# Patient Record
Sex: FEMALE | Race: WHITE | Employment: OTHER | URBAN - METROPOLITAN AREA
[De-identification: names, ages, dates, MRNs, and addresses within clinical notes are randomized per-mention and may not be internally consistent; named-entity substitution may affect disease eponyms.]

---

## 2017-02-28 RX ORDER — ATORVASTATIN CALCIUM 10 MG/1
TABLET, FILM COATED ORAL
Qty: 90 TABLET | Refills: 3 | Status: ON HOLD | OUTPATIENT
Start: 2017-02-28 | End: 2017-11-11

## 2017-02-28 RX ORDER — METOPROLOL SUCCINATE 25 MG/1
TABLET, EXTENDED RELEASE ORAL
Qty: 45 TABLET | Refills: 3 | Status: ON HOLD | OUTPATIENT
Start: 2017-02-28 | End: 2017-11-21

## 2017-08-14 RX ORDER — FERROUS SULFATE 325(65) MG
TABLET ORAL
Qty: 90 TABLET | Refills: 0 | OUTPATIENT
Start: 2017-08-14

## 2017-08-18 RX ORDER — FERROUS SULFATE 325(65) MG
TABLET ORAL
Qty: 90 TABLET | Refills: 3
Start: 2017-08-18

## 2017-08-18 NOTE — TELEPHONE ENCOUNTER
From: Angela Fisher  Sent: 8/18/2017 9:26 AM CDT  Subject: Medication Renewal Request    Angela Fisher would like a refill of the following medications:  FERROUS SULFATE 325 (65 FE) MG Oral Tab Kimberly Marcos MD]    Preferred pharmacy: [address] [place] [address] #

## 2017-09-16 ENCOUNTER — APPOINTMENT (OUTPATIENT)
Dept: CT IMAGING | Facility: HOSPITAL | Age: 82
DRG: 871 | End: 2017-09-16
Attending: EMERGENCY MEDICINE
Payer: MEDICARE

## 2017-09-16 ENCOUNTER — HOSPITAL ENCOUNTER (INPATIENT)
Facility: HOSPITAL | Age: 82
LOS: 5 days | Discharge: NURSING, REHAB OR LONG-TERM CARE | DRG: 871 | End: 2017-09-21
Attending: EMERGENCY MEDICINE | Admitting: HOSPITALIST
Payer: MEDICARE

## 2017-09-16 ENCOUNTER — APPOINTMENT (OUTPATIENT)
Dept: GENERAL RADIOLOGY | Facility: HOSPITAL | Age: 82
DRG: 871 | End: 2017-09-16
Attending: EMERGENCY MEDICINE
Payer: MEDICARE

## 2017-09-16 DIAGNOSIS — D50.9 IRON DEFICIENCY ANEMIA, UNSPECIFIED IRON DEFICIENCY ANEMIA TYPE: ICD-10-CM

## 2017-09-16 DIAGNOSIS — R19.7 DIARRHEA, UNSPECIFIED TYPE: ICD-10-CM

## 2017-09-16 DIAGNOSIS — N17.9 ACUTE RENAL FAILURE, UNSPECIFIED ACUTE RENAL FAILURE TYPE (HCC): ICD-10-CM

## 2017-09-16 DIAGNOSIS — A41.9 SEPSIS WITH HYPOTENSION (HCC): Primary | ICD-10-CM

## 2017-09-16 DIAGNOSIS — I95.9 SEPSIS WITH HYPOTENSION (HCC): Primary | ICD-10-CM

## 2017-09-16 DIAGNOSIS — N30.01 ACUTE CYSTITIS WITH HEMATURIA: ICD-10-CM

## 2017-09-16 LAB
ALBUMIN SERPL-MCNC: 2.1 G/DL (ref 3.5–4.8)
ALLENS TEST: POSITIVE
ALP LIVER SERPL-CCNC: 504 U/L (ref 55–142)
ALT SERPL-CCNC: 25 U/L (ref 14–54)
ARTERIAL BLD GAS O2 SATURATION: 91 % (ref 92–100)
ARTERIAL BLOOD GAS BASE EXCESS: -9.8
ARTERIAL BLOOD GAS HCO3: 13.9 MEQ/L (ref 22–26)
ARTERIAL BLOOD GAS PCO2: 24 MM HG (ref 35–45)
ARTERIAL BLOOD GAS PH: 7.38 (ref 7.35–7.45)
ARTERIAL BLOOD GAS PO2: 66 MM HG (ref 80–105)
AST SERPL-CCNC: 35 U/L (ref 15–41)
BAND %: 13 %
BASOPHIL % MANUAL: 0 %
BASOPHIL ABSOLUTE MANUAL: 0 X10(3) UL (ref 0–0.1)
BILIRUB SERPL-MCNC: 0.7 MG/DL (ref 0.1–2)
BILIRUB UR QL STRIP.AUTO: NEGATIVE
BUN BLD-MCNC: 98 MG/DL (ref 8–20)
CALCIUM BLD-MCNC: 8.2 MG/DL (ref 8.3–10.3)
CALCULATED O2 SATURATION: 91 % (ref 92–100)
CARBOXYHEMOGLOBIN: 1 % SAT (ref 0–3)
CHLORIDE: 101 MMOL/L (ref 101–111)
CO2: 16 MMOL/L (ref 22–32)
CREAT BLD-MCNC: 4.02 MG/DL (ref 0.55–1.02)
EOSINOPHIL % MANUAL: 0 %
EOSINOPHIL ABSOLUTE MANUAL: 0 X10(3) UL (ref 0–0.3)
ERYTHROCYTE [DISTWIDTH] IN BLOOD BY AUTOMATED COUNT: 14 % (ref 11.5–16)
EST. AVERAGE GLUCOSE BLD GHB EST-MCNC: 151 MG/DL (ref 68–126)
GLUCOSE BLD-MCNC: 154 MG/DL (ref 70–99)
GLUCOSE BLD-MCNC: 174 MG/DL (ref 65–99)
GLUCOSE BLD-MCNC: 176 MG/DL (ref 65–99)
GLUCOSE UR STRIP.AUTO-MCNC: NEGATIVE MG/DL
HBA1C MFR BLD HPLC: 6.9 % (ref ?–5.7)
HCT VFR BLD AUTO: 27.9 % (ref 34–50)
HGB BLD-MCNC: 9.4 G/DL (ref 12–16)
IONIZED CALCIUM: 1.05 MMOL/L (ref 1.12–1.32)
KETONES UR STRIP.AUTO-MCNC: NEGATIVE MG/DL
LACTIC ACID ARTERIAL: 2.5 MMOL/L (ref 0.5–2)
LACTIC ACID: 1.4 MMOL/L (ref 0.5–2)
LACTIC ACID: 2 MMOL/L (ref 0.5–2)
LACTIC ACID: 6.1 MMOL/L (ref 0.5–2)
LYMPHOCYTE % MANUAL: 3 %
LYMPHOCYTE ABSOLUTE MANUAL: 0.71 X10(3) UL (ref 0.9–4)
M PROTEIN MFR SERPL ELPH: 6.1 G/DL (ref 6.1–8.3)
MCH RBC QN AUTO: 29.5 PG (ref 27–33.2)
MCHC RBC AUTO-ENTMCNC: 33.7 G/DL (ref 31–37)
MCV RBC AUTO: 87.5 FL (ref 81–100)
METAMYELOCYTE %: 1 %
METAMYELOCYTE ABSOLUTE MANUAL: 0.24 X10(3) UL (ref ?–0.01)
METHEMOGLOBIN: 0.5 % SAT (ref 0.4–1.5)
MONOCYTE % MANUAL: 1 %
MONOCYTE ABSOLUTE MANUAL: 0.24 X10(3) UL (ref 0.1–0.6)
MORPHOLOGY: NORMAL
NEUTROPHIL ABS PRELIM: 19.73 X10 (3) UL (ref 1.3–6.7)
NEUTROPHIL ABSOLUTE MANUAL: 22.42 X10(3) UL (ref 1.3–6.7)
NEUTROPHILS % MANUAL: 82 %
NITRITE UR QL STRIP.AUTO: NEGATIVE
NRBC CALCULATED: 1
PATIENT TEMPERATURE: 98.9 F
PH UR STRIP.AUTO: 5 [PH] (ref 4.5–8)
PLATELET # BLD AUTO: 69 10(3)UL (ref 150–450)
PLATELET MORPHOLOGY: NORMAL
POTASSIUM BLOOD GAS: 2.3 MMOL/L (ref 3.6–5.1)
POTASSIUM SERPL-SCNC: 2.6 MMOL/L (ref 3.6–5.1)
PROT UR STRIP.AUTO-MCNC: >=500 MG/DL
RBC # BLD AUTO: 3.19 X10(6)UL (ref 3.8–5.1)
RBC #/AREA URNS AUTO: >10 /HPF
RED CELL DISTRIBUTION WIDTH-SD: 44.7 FL (ref 35.1–46.3)
SODIUM BLOOD GAS: 136 MMOL/L (ref 136–144)
SODIUM SERPL-SCNC: 136 MMOL/L (ref 136–144)
SP GR UR STRIP.AUTO: 1.02 (ref 1–1.03)
TOTAL CELLS COUNTED: 100
TOTAL HEMOGLOBIN: 9.1 G/DL (ref 11.7–16)
TROPONIN: 0.12 NG/ML (ref ?–0.05)
TROPONIN: 0.17 NG/ML (ref ?–0.05)
UROBILINOGEN UR STRIP.AUTO-MCNC: <2 MG/DL
WBC # BLD AUTO: 23.6 X10(3) UL (ref 4–13)
WBC #/AREA URNS AUTO: >50 /HPF

## 2017-09-16 PROCEDURE — B547ZZA ULTRASONOGRAPHY OF LEFT SUBCLAVIAN VEIN, GUIDANCE: ICD-10-PCS | Performed by: HOSPITALIST

## 2017-09-16 PROCEDURE — 99222 1ST HOSP IP/OBS MODERATE 55: CPT | Performed by: INTERNAL MEDICINE

## 2017-09-16 PROCEDURE — 05H633Z INSERTION OF INFUSION DEVICE INTO LEFT SUBCLAVIAN VEIN, PERCUTANEOUS APPROACH: ICD-10-PCS | Performed by: HOSPITALIST

## 2017-09-16 PROCEDURE — 74176 CT ABD & PELVIS W/O CONTRAST: CPT | Performed by: EMERGENCY MEDICINE

## 2017-09-16 PROCEDURE — 99223 1ST HOSP IP/OBS HIGH 75: CPT | Performed by: HOSPITALIST

## 2017-09-16 PROCEDURE — 71010 XR CHEST AP PORTABLE  (CPT=71010): CPT | Performed by: EMERGENCY MEDICINE

## 2017-09-16 RX ORDER — ACETAMINOPHEN 325 MG/1
650 TABLET ORAL EVERY 6 HOURS PRN
Status: DISCONTINUED | OUTPATIENT
Start: 2017-09-16 | End: 2017-09-21

## 2017-09-16 RX ORDER — AMOXICILLIN 500 MG
1200 CAPSULE ORAL
COMMUNITY

## 2017-09-16 RX ORDER — SODIUM CHLORIDE 9 MG/ML
INJECTION, SOLUTION INTRAVENOUS CONTINUOUS
Status: DISCONTINUED | OUTPATIENT
Start: 2017-09-16 | End: 2017-09-17

## 2017-09-16 RX ORDER — HEPARIN SODIUM 5000 [USP'U]/ML
5000 INJECTION, SOLUTION INTRAVENOUS; SUBCUTANEOUS EVERY 12 HOURS SCHEDULED
Status: DISCONTINUED | OUTPATIENT
Start: 2017-09-16 | End: 2017-09-18

## 2017-09-16 RX ORDER — SODIUM CHLORIDE 9 MG/ML
INJECTION, SOLUTION INTRAVENOUS CONTINUOUS
Status: DISCONTINUED | OUTPATIENT
Start: 2017-09-16 | End: 2017-09-16

## 2017-09-16 RX ORDER — ASPIRIN 81 MG/1
81 TABLET ORAL DAILY
Status: DISCONTINUED | OUTPATIENT
Start: 2017-09-16 | End: 2017-09-21

## 2017-09-16 RX ORDER — SODIUM CHLORIDE 9 MG/ML
INJECTION, SOLUTION INTRAVENOUS CONTINUOUS
Status: ACTIVE | OUTPATIENT
Start: 2017-09-16 | End: 2017-09-16

## 2017-09-16 RX ORDER — POTASSIUM CHLORIDE 14.9 MG/ML
20 INJECTION INTRAVENOUS ONCE
Status: COMPLETED | OUTPATIENT
Start: 2017-09-16 | End: 2017-09-16

## 2017-09-16 RX ORDER — DEXTROSE MONOHYDRATE 25 G/50ML
50 INJECTION, SOLUTION INTRAVENOUS
Status: DISCONTINUED | OUTPATIENT
Start: 2017-09-16 | End: 2017-09-21

## 2017-09-16 RX ORDER — PYRIDOXINE HCL (VITAMIN B6) 100 MG
600 TABLET ORAL DAILY
Status: ON HOLD | COMMUNITY
End: 2017-11-11

## 2017-09-16 RX ORDER — SODIUM CHLORIDE 0.9 % (FLUSH) 0.9 %
10 SYRINGE (ML) INJECTION EVERY 12 HOURS
Status: DISCONTINUED | OUTPATIENT
Start: 2017-09-16 | End: 2017-09-21

## 2017-09-16 RX ORDER — ONDANSETRON 2 MG/ML
4 INJECTION INTRAMUSCULAR; INTRAVENOUS EVERY 6 HOURS PRN
Status: DISCONTINUED | OUTPATIENT
Start: 2017-09-16 | End: 2017-09-21

## 2017-09-16 NOTE — CONSULTS
[id number] Cedrick Montanez Patient Status:  Inpatient    1921 MRN [id number]   [facility] 4SW-A Attending Xiao Granados MD   Hosp Day # 0 PCP Kortney Porter     Date of Admission: 2017  Admission Diagnosis: Acute c Encounter):  psyllium 28 % Oral Powd Pack Take 1 packet by mouth 2 (two) times daily. Disp:  Rfl:    Omega-3 Fatty Acids (FISH OIL) 1200 MG Oral Cap Take 1,200 mg by mouth.  Disp:  Rfl:         Current Medications:    Current Facility-Administered Medicatio vertigo  Psychiatric: denies insomnia, depression, anxiety, or drug abuse  Endocrine: denies polydypsia, polyuria, cold/heat intolerance  Hematologic/lymphatic: denies easy bruising, new blood clots, or lymphedema  Allergic/immunologic: denies hay fever, h (9/16/17): #1. Hyperexpansion of the lungs. #2. Bilateral basilar opacities, left greater than right, representing atelectasis versus infiltrates. #3. Possible small left-sided pleural effusion. #4. Stable cardiomegaly.   #5. Stable calcified lesions w

## 2017-09-16 NOTE — PLAN OF CARE
Pt arrived via cart from ED. Alert, some forgetfulness. NS infusing, K infusing. Pt appears comfortable. Stool sent earlier today by ED to r/o CDif: negative.     Pt reports no blood pressure, no blood draws on right arm d/t lumpectomy \"about 20 years a

## 2017-09-16 NOTE — ED PROVIDER NOTES
Patient Seen in: [facility] Emergency Department    History   Patient presents with:  Dehydration (metabolic/constitutional)  Fall (musculoskeletal, neurologic)    Stated Complaint: Alfredo Delgado    HPI    75-year-old white female who presents lele except as noted above. PSFH elements reviewed from today and agreed except as otherwise stated in HPI.     Physical Exam   ED Triage Vitals  BP: (!) 86/46 [09/16/17 0922]  Pulse: 108 [09/16/17 0922]  Resp: 20 [09/16/17 0922]  Temp: 98.9 °F (37.2 °C) [09/ components within normal limits   LACTIC ACID, PLASMA - Abnormal; Notable for the following:     Lactic Acid 6.1 (*)     All other components within normal limits   ABG PANEL W ELECT AND LACTATE - Abnormal; Notable for the following:     ABG pCO2 24 (*) Abnormality         Status                     ---------                               -----------         ------                     STOOL CULTURE(P)[[id number]]                                 In process                 SHIGATOXIN[[id number]] atelectasis/infiltrates. Osteoarthritic changes within both shoulders. No pneumothorax. Calcified lesion within   the superior mediastinum.      Impression     CONCLUSION:    #1. Hyperexpansion of the lungs.    #2. Bilateral basilar opacities, left greater ICD-10-CM Noted POA    * (Principal)Sepsis with hypotension ([facility] [address] A41.9 9/16/2017 Unknown    Acute cystitis with hematuria N30.01 9/16/2017     Acute renal failure, unspecified acute renal failure type ([facility] [address] N17.9 9/16/2017     Diarrhea, unspecified type R

## 2017-09-16 NOTE — PROGRESS NOTES
Pharmacy Note: Renal dose adjustment of Meropenem    Dee Trujillo is a 80year old female who has been prescribed Meropenem 500 mg IV every 8 hrs.   CrCl is 6.4 ml/min so the dose has been adjusted  to Meropenem 500 mg IV every 24 hr per hospital renal

## 2017-09-16 NOTE — PROGRESS NOTES
[facility]      Sepsis Reassessment Note    BP (!) 87/43   Pulse 97   Temp 98.9 °F (37.2 °C)   Resp 28   Ht 147.3 cm (4' 10\")   Wt 110 lb (49.9 kg)   SpO2 100%   BMI 22.99 kg/m²      2:18 PM    Cardiac:  Regularity: Regular  Rate: Tachycardic  Hear

## 2017-09-16 NOTE — CONSULTS
[facility]  Report of Consultation    Tee Garcia Patient Status:  Emergency    1921 MRN [id number]   Location [facility] Attending Cachorro García MD   Hosp Day # 0 PCP Stacy Rubio     Reason for [facility] CALCIUM 10 MG Oral Tab TAKE 1 TABLET (10MG)  BY ORAL ROUTE  EVERY DAY   Multiple Vitamins-Minerals (PRESERVISION AREDS) Oral Tab Take 1 tablet by mouth 2 (two) times daily.    FERROUS SULFATE 325 (65 FE) MG Oral Tab TAKE 1 TABLET (325MG)  BY ORAL ROUTE EVER lopez      Laboratory Data:    Lab Results  Component Value Date   WBC 23.6 09/16/2017   HGB 9.4 09/16/2017   HCT 27.9 09/16/2017   PLT 69.0 09/16/2017   CREATSERUM 4.02 09/16/2017   BUN 98 09/16/2017    09/16/2017   K 2.6 09/16/2017    09/16/

## 2017-09-16 NOTE — CONSULTS
INFECTIOUS DISEASE CONSULT NOTE    Stacey Deluca Patient Status:  Inpatient    1921 MRN [id number]   [facility] 4SW-A Attending Artis Granados MD   Hosp Day # 0 PCP Eriberto Sanchez Do that she has never smoked. She has never used smokeless tobacco. She reports that she drinks about 0.5 oz of alcohol per week .     Allergies:    Clindamycin             Diarrhea  Ceftin                      Comment:Other reaction(s): CEFUROXIME AXETIL    M all extremities. No swelling noted. Integument: No lesions. No erythema. No open wounds.     Laboratory Data:    Recent Labs   Lab  09/16/17   0933   RBC  3.19*   HGB  9.4*   HCT  27.9*   MCV  87.5   MCH  29.5   MCHC  33.7   RDW  14.0   NEPRELIM  19.73* glands are thickened and nodular. KIDNEYS:  The right kidney is within the pelvis. No evidence of hydronephrosis. AORTA/VASCULAR:  Extensive atherosclerosis. RETROPERITONEUM:  Unremarkable. BOWEL/MESENTERY:  Scattered colonic diverticula.  No evidence of a MD on 9/16/2017 at 10:37     Approved by: Bob Lawler MD               ASSESSMENT:    1. Hypotension/ shock- ? Sepsis vs related to vol depletion from poor po intake. CT A/P w/o acute changes and Cdiff neg, but UA abnl, could have a UTI.  No other obvi

## 2017-09-16 NOTE — ED INITIAL ASSESSMENT (HPI)
Pt states she was taking an antibiotic for a left leg infection and then got sick with c-diff. Pt was then having frequent diarrhea and was placed on another antibiotic for this and states she is having a lot of diarrhea and stools appear dark.  Pt is very

## 2017-09-16 NOTE — H&P
[facility]  History and Physical     Renate Paris Patient Status:  Emergency    1921 MRN [id number]   Location [facility] Attending Marvel Chu MD   Hosp Day # 0 PCP Jazmine Adkins     Chief Complai Diarrhea  Ceftin                      Comment:Other reaction(s): CEFUROXIME AXETIL    Medications:    No current facility-administered medications on file prior to encounter.    Current Outpatient Prescriptions on File Prior to Encounter:  METOPROLOL SUCCIN sounds. No rebound, guarding or organomegaly. Neurologic: No focal neurological deficits. CNII-XII grossly intact. Musculoskeletal: Moves all extremities. Extremities: No edema or cyanosis. Integument: No rashes or lesions.    Psychiatric: Appropriate m

## 2017-09-17 ENCOUNTER — APPOINTMENT (OUTPATIENT)
Dept: GENERAL RADIOLOGY | Facility: HOSPITAL | Age: 82
DRG: 871 | End: 2017-09-17
Attending: INTERNAL MEDICINE
Payer: MEDICARE

## 2017-09-17 ENCOUNTER — APPOINTMENT (OUTPATIENT)
Dept: CV DIAGNOSTICS | Facility: HOSPITAL | Age: 82
DRG: 871 | End: 2017-09-17
Attending: INTERNAL MEDICINE
Payer: MEDICARE

## 2017-09-17 LAB
ALBUMIN SERPL-MCNC: 1.7 G/DL (ref 3.5–4.8)
ALP LIVER SERPL-CCNC: 300 U/L (ref 55–142)
ALT SERPL-CCNC: 22 U/L (ref 14–54)
AST SERPL-CCNC: 35 U/L (ref 15–41)
ATRIAL RATE: 64 BPM
BASOPHILS # BLD AUTO: 0.02 X10(3) UL (ref 0–0.1)
BASOPHILS NFR BLD AUTO: 0.1 %
BILIRUB SERPL-MCNC: 0.8 MG/DL (ref 0.1–2)
BUN BLD-MCNC: 90 MG/DL (ref 8–20)
CALCIUM BLD-MCNC: 7.5 MG/DL (ref 8.3–10.3)
CHLORIDE: 109 MMOL/L (ref 101–111)
CK: 142 IU/L (ref 26–192)
CO2: 14 MMOL/L (ref 22–32)
CREAT BLD-MCNC: 2.99 MG/DL (ref 0.55–1.02)
EOSINOPHIL # BLD AUTO: 0.03 X10(3) UL (ref 0–0.3)
EOSINOPHIL NFR BLD AUTO: 0.1 %
ERYTHROCYTE [DISTWIDTH] IN BLOOD BY AUTOMATED COUNT: 14.2 % (ref 11.5–16)
GLUCOSE BLD-MCNC: 148 MG/DL (ref 65–99)
GLUCOSE BLD-MCNC: 171 MG/DL (ref 70–99)
GLUCOSE BLD-MCNC: 216 MG/DL (ref 65–99)
GLUCOSE BLD-MCNC: 280 MG/DL (ref 65–99)
HCT VFR BLD AUTO: 25.9 % (ref 34–50)
HGB BLD-MCNC: 8.6 G/DL (ref 12–16)
IMMATURE GRANULOCYTE COUNT: 0.28 X10(3) UL (ref 0–1)
IMMATURE GRANULOCYTE RATIO %: 1.4 %
LYMPHOCYTES # BLD AUTO: 0.57 X10(3) UL (ref 0.9–4)
LYMPHOCYTES NFR BLD AUTO: 2.8 %
M PROTEIN MFR SERPL ELPH: 5.2 G/DL (ref 6.1–8.3)
MCH RBC QN AUTO: 29.1 PG (ref 27–33.2)
MCHC RBC AUTO-ENTMCNC: 33.2 G/DL (ref 31–37)
MCV RBC AUTO: 87.5 FL (ref 81–100)
MONOCYTES # BLD AUTO: 0.5 X10(3) UL (ref 0.1–0.6)
MONOCYTES NFR BLD AUTO: 2.4 %
NEUTROPHIL ABS PRELIM: 19.12 X10 (3) UL (ref 1.3–6.7)
NEUTROPHILS # BLD AUTO: 19.12 X10(3) UL (ref 1.3–6.7)
NEUTROPHILS NFR BLD AUTO: 93.2 %
P AXIS: 55 DEGREES
P-R INTERVAL: 152 MS
PLATELET # BLD AUTO: 40 10(3)UL (ref 150–450)
POTASSIUM SERPL-SCNC: 3 MMOL/L (ref 3.6–5.1)
POTASSIUM SERPL-SCNC: 3.9 MMOL/L (ref 3.6–5.1)
Q-T INTERVAL: 336 MS
QRS DURATION: 84 MS
QTC CALCULATION (BEZET): 464 MS
R AXIS: 17 DEGREES
RBC # BLD AUTO: 2.96 X10(6)UL (ref 3.8–5.1)
RED CELL DISTRIBUTION WIDTH-SD: 45.5 FL (ref 35.1–46.3)
SODIUM SERPL-SCNC: 141 MMOL/L (ref 136–144)
T AXIS: 27 DEGREES
TROPONIN: 0.14 NG/ML (ref ?–0.05)
VENTRICULAR RATE: 115 BPM
WBC # BLD AUTO: 20.5 X10(3) UL (ref 4–13)

## 2017-09-17 PROCEDURE — 71010 XR CHEST AP PORTABLE  (CPT=71010): CPT | Performed by: INTERNAL MEDICINE

## 2017-09-17 PROCEDURE — 93306 TTE W/DOPPLER COMPLETE: CPT | Performed by: INTERNAL MEDICINE

## 2017-09-17 PROCEDURE — 99233 SBSQ HOSP IP/OBS HIGH 50: CPT | Performed by: INTERNAL MEDICINE

## 2017-09-17 PROCEDURE — 99232 SBSQ HOSP IP/OBS MODERATE 35: CPT | Performed by: HOSPITALIST

## 2017-09-17 RX ORDER — IPRATROPIUM BROMIDE AND ALBUTEROL SULFATE 2.5; .5 MG/3ML; MG/3ML
3 SOLUTION RESPIRATORY (INHALATION) EVERY 4 HOURS PRN
Status: DISCONTINUED | OUTPATIENT
Start: 2017-09-17 | End: 2017-09-21

## 2017-09-17 RX ORDER — ASPIRIN 81 MG/1
81 TABLET ORAL DAILY
Status: DISCONTINUED | OUTPATIENT
Start: 2017-09-17 | End: 2017-09-17

## 2017-09-17 RX ORDER — GARLIC EXTRACT 500 MG
1 CAPSULE ORAL DAILY
Status: DISCONTINUED | OUTPATIENT
Start: 2017-09-17 | End: 2017-09-21

## 2017-09-17 RX ORDER — DIPHENOXYLATE HYDROCHLORIDE AND ATROPINE SULFATE 2.5; .025 MG/1; MG/1
1 TABLET ORAL 4 TIMES DAILY PRN
Status: DISCONTINUED | OUTPATIENT
Start: 2017-09-17 | End: 2017-09-21

## 2017-09-17 NOTE — PLAN OF CARE
Pt a&ox3-4, forgetful at times. Denies pain, breathing easy/non-labored. BP within normal limits, sitting up eating breakfast.  Cards consulted, Dr. Roque Alamo at bedside, plan for 2D echo today. Okay to tx to floor with tele/pulse ox.   See further assessme

## 2017-09-17 NOTE — PROGRESS NOTES
[facility]  Progress Note     Alvester Page Patient Status:  Inpatient    1921 MRN [id number]   [facility] 4SW-A Attending Alexandra Jolly MD   Hosp Day # 1 PCP Wilfrido Melara     Chief Complaint: Diarrhea    S: Patient • vancomycin  125 mg Oral BID   • Normal Saline Flush  10 mL Intravenous Q12H       ASSESSMENT / PLAN:     1. Severe Sepsis:  E. Coli septicemia, continue with abx, await culture wes. 2. JONAS:  Markedly improved, IVF's, follow  3.  E. Coli UTI:  Presum

## 2017-09-17 NOTE — PROGRESS NOTES
[facility]  TEL: [phone]  FAX: [phone] [facility] Patient Status:  Inpatient    1921 MRN [id number]   [facility] 4NW-A Attending Dina Fagan MD   Hosp Day # 1 PCP Margot Ashby Abdomen+pelvis(cpt=74176)    Result Date: 9/16/2017  PROCEDURE:  CT ABDOMEN+PELVIS(CPT=74176)  COMPARISON:  None.   INDICATIONS:  FALL, WEAKNESS  TECHNIQUE:  Unenhanced multislice CT scanning was performed from the dome of the diaphragm to the pubic symphys 9/16/2017  PROCEDURE:  XR CHEST AP PORTABLE (CPT=71010)  TECHNIQUE:  AP chest radiograph was obtained. COMPARISON:  EDWARD , XR RIBS WITH CHEST (3 VIEWS), LEFT  (CPT=71101), 12/18/2016, 19:25.   INDICATIONS:  FALL, WEAKNESS  PATIENT STATED HISTORY: (As tra significant diarrhea. Case and plan d/w pt. Will follow.     Lisandra Urrutia

## 2017-09-17 NOTE — PLAN OF CARE
Rec'd report from previous RN, care assumed at [address]. Pt sitting up, vital signs stable, no acute distress noted. Pt legally blind though can see shapes, AOx4, slightly forgetful at times. Pt SR on monitor with BP within normal limits.   Remains on 2L tristen

## 2017-09-17 NOTE — PROGRESS NOTES
[id number] Cedrick Montanez Patient Status:  Inpatient    1921 MRN [id number]   [facility] 4SW-A Attending Liberty Garsia MD   Hosp Day # 1 PCP Migue Kc     Critical Care Progress Note     Date of Admission: 20 22   Ht 4' 10\" (1.473 m)   Wt 121 lb 7.6 oz (55.1 kg)   SpO2 97%   BMI 25.39 kg/m²      Oxygen requirements: RA      Wt Readings from Last 3 Encounters:  09/16/17 : 121 lb 7.6 oz (55.1 kg)  12/21/16 : 107 lb 11.2 oz (48.9 kg)  11/15/16 : 110 lb (49.9 kg) daily blood cultures  2. Near syncope: 2/2 likely orthostatic hypotension in setting of dehydration and sepsis  -continue telemetry monitor to assess for arrhythmias  -will check troponins  -echo in 2016 with preserved EF and G1DD  3.  JONAS: suspect prerenal

## 2017-09-17 NOTE — PROGRESS NOTES
09/17/17 0356   Clinical Encounter Type   Visited With Patient  (Niece at bedside)   Routine Visit Introduction   Continue Visiting No   Patient's Supportive Strategies/Resources  offered calm non anxious presence and support.  Pt. requested Cath

## 2017-09-17 NOTE — PROGRESS NOTES
[facility]  Nephrology Progress Note    Marshacharu Floyd Patient Status:  Inpatient    1921 MRN [id number]   [facility] 4SW-A Attending Xiao Granados MD   Hosp Day # 1 PCP Sushila RIBEIRO       SUBJECTIVE:  Looks/feels much Meds:     Current Facility-Administered Medications:  potassium chloride 40 mEq in sodium chloride 0.9 % 250 mL IVPB 40 mEq Intravenous Once   meropenem (MERREM) IVPB 500 mg/100 ml in 0.9% NaCl minibag 500 mg Intravenous Q24H   aspirin EC tab 81 mg 8

## 2017-09-17 NOTE — CONSULTS
[facility] Cardiology  Report of Consultation    Edson Hernandez Patient Status:  Inpatient    1921 MRN [id number]   [facility] 4SW-A Attending Nikos Guo MD   Hosp Day # 1 PCP Roro Miller     Reason for Consulta Intravenous Q24H   • aspirin  81 mg Oral Daily   • Heparin Sodium (Porcine)  5,000 Units Subcutaneous 2 times per day   • Insulin Aspart Pen  1-10 Units Subcutaneous TID AC and HS   • vancomycin  125 mg Oral BID   • Normal Saline Flush  10 mL Intravenous Q Resp: 28   Temp:      Wt Readings from Last 3 Encounters:  09/16/17 : 121 lb 7.6 oz (55.1 kg)  12/21/16 : 107 lb 11.2 oz (48.9 kg)  11/15/16 : 110 lb (49.9 kg)          General: Well developed, well nourished female. Pt is in no acute distress.   HEENT: Tele:  ST  EKG: ST with PACs. Non-specific ST and T abn. Assessment:  1.  +Troponin   -Suspect demand of sepsis   -Similar circumstances 12/16   -RI also contributing  2. Urosepsis  3. HTN  4.  HL  5.  DM  6.   Presyncope   -Suspect reflects sep

## 2017-09-18 ENCOUNTER — APPOINTMENT (OUTPATIENT)
Dept: NUCLEAR MEDICINE | Facility: HOSPITAL | Age: 82
DRG: 871 | End: 2017-09-18
Attending: INTERNAL MEDICINE
Payer: MEDICARE

## 2017-09-18 LAB
ALBUMIN SERPL-MCNC: 1.5 G/DL (ref 3.5–4.8)
ALP LIVER SERPL-CCNC: 326 U/L (ref 55–142)
ALT SERPL-CCNC: 29 U/L (ref 14–54)
AST SERPL-CCNC: 51 U/L (ref 15–41)
ATRIAL RATE: 123 BPM
BAND %: 15 %
BAND %: 20 %
BASOPHIL % MANUAL: 0 %
BASOPHIL % MANUAL: 0 %
BASOPHIL ABSOLUTE MANUAL: 0 X10(3) UL (ref 0–0.1)
BASOPHIL ABSOLUTE MANUAL: 0 X10(3) UL (ref 0–0.1)
BILIRUB SERPL-MCNC: 0.8 MG/DL (ref 0.1–2)
BUN BLD-MCNC: 86 MG/DL (ref 8–20)
CALCIUM BLD-MCNC: 7.3 MG/DL (ref 8.3–10.3)
CHLORIDE: 105 MMOL/L (ref 101–111)
CO2: 21 MMOL/L (ref 22–32)
CREAT BLD-MCNC: 2.73 MG/DL (ref 0.55–1.02)
DEPRECATED HBV CORE AB SER IA-ACNC: 648.7 NG/ML (ref 10–291)
EOSINOPHIL % MANUAL: 1 %
EOSINOPHIL % MANUAL: 2 %
EOSINOPHIL ABSOLUTE MANUAL: 0.16 X10(3) UL (ref 0–0.3)
EOSINOPHIL ABSOLUTE MANUAL: 0.33 X10(3) UL (ref 0–0.3)
ERYTHROCYTE [DISTWIDTH] IN BLOOD BY AUTOMATED COUNT: 13.9 % (ref 11.5–16)
ERYTHROCYTE [DISTWIDTH] IN BLOOD BY AUTOMATED COUNT: 14.1 % (ref 11.5–16)
FIBRINOGEN: 669 MG/DL (ref 200–446)
FOLATE (FOLIC ACID), SERUM: 44.4 NG/ML (ref 8.7–24)
GLUCOSE BLD-MCNC: 196 MG/DL (ref 70–99)
GLUCOSE BLD-MCNC: 199 MG/DL (ref 65–99)
GLUCOSE BLD-MCNC: 221 MG/DL (ref 65–99)
GLUCOSE BLD-MCNC: 243 MG/DL (ref 65–99)
GLUCOSE BLD-MCNC: 271 MG/DL (ref 65–99)
GLUCOSE BLD-MCNC: 330 MG/DL (ref 65–99)
HAV AB SERPL IA-ACNC: >2000 PG/ML (ref 193–986)
HCT VFR BLD AUTO: 22.9 % (ref 34–50)
HCT VFR BLD AUTO: 24.3 % (ref 34–50)
HEPARIN INDUCED PLT ANTIBODY: NEGATIVE
HGB BLD-MCNC: 8 G/DL (ref 12–16)
HGB BLD-MCNC: 8.6 G/DL (ref 12–16)
INR BLD: 1.09 (ref 0.89–1.11)
IRON SATURATION: 7 % (ref 13–45)
IRON: 12 UG/DL (ref 28–170)
LYMPHOCYTE % MANUAL: 1 %
LYMPHOCYTE % MANUAL: 5 %
LYMPHOCYTE ABSOLUTE MANUAL: 0.16 X10(3) UL (ref 0.9–4)
LYMPHOCYTE ABSOLUTE MANUAL: 0.82 X10(3) UL (ref 0.9–4)
M PROTEIN MFR SERPL ELPH: 4.9 G/DL (ref 6.1–8.3)
MCH RBC QN AUTO: 29.3 PG (ref 27–33.2)
MCH RBC QN AUTO: 29.6 PG (ref 27–33.2)
MCHC RBC AUTO-ENTMCNC: 34.9 G/DL (ref 31–37)
MCHC RBC AUTO-ENTMCNC: 35.4 G/DL (ref 31–37)
MCV RBC AUTO: 83.5 FL (ref 81–100)
MCV RBC AUTO: 83.9 FL (ref 81–100)
MONOCYTE % MANUAL: 2 %
MONOCYTE % MANUAL: 3 %
MONOCYTE ABSOLUTE MANUAL: 0.33 X10(3) UL (ref 0.1–0.6)
MONOCYTE ABSOLUTE MANUAL: 0.49 X10(3) UL (ref 0.1–0.6)
NEUTROPHIL ABS PRELIM: 13.73 X10 (3) UL (ref 1.3–6.7)
NEUTROPHIL ABS PRELIM: 13.92 X10 (3) UL (ref 1.3–6.7)
NEUTROPHIL ABSOLUTE MANUAL: 14.92 X10(3) UL (ref 1.3–6.7)
NEUTROPHIL ABSOLUTE MANUAL: 15.39 X10(3) UL (ref 1.3–6.7)
NEUTROPHILS % MANUAL: 71 %
NEUTROPHILS % MANUAL: 80 %
P AXIS: 36 DEGREES
P-R INTERVAL: 136 MS
PLATELET # BLD AUTO: 16 10(3)UL (ref 150–450)
PLATELET # BLD AUTO: 18 10(3)UL (ref 150–450)
PLATELET MORPHOLOGY: NORMAL
PLATELET MORPHOLOGY: NORMAL
POTASSIUM SERPL-SCNC: 3.2 MMOL/L (ref 3.6–5.1)
POTASSIUM SERPL-SCNC: 3.7 MMOL/L (ref 3.6–5.1)
PRO-BETA NATRIURETIC PEPTIDE: ABNORMAL PG/ML (ref ?–450)
PSA SERPL DL<=0.01 NG/ML-MCNC: 14.1 SECONDS (ref 12–14.3)
Q-T INTERVAL: 292 MS
QRS DURATION: 76 MS
QTC CALCULATION (BEZET): 418 MS
R AXIS: -3 DEGREES
RBC # BLD AUTO: 2.73 X10(6)UL (ref 3.8–5.1)
RBC # BLD AUTO: 2.91 X10(6)UL (ref 3.8–5.1)
RED CELL DISTRIBUTION WIDTH-SD: 42.5 FL (ref 35.1–46.3)
RED CELL DISTRIBUTION WIDTH-SD: 43.2 FL (ref 35.1–46.3)
RETIC ABS CALC AUTO: 18 X10(3) UL (ref 22.5–147.5)
RETIC IRF CALC: 0.07 RATIO (ref 0.09–0.3)
RETIC%: 0.6 % (ref 0.5–2.5)
RETICULOCYTE HEMOGLOBIN EQUIVALENT: 24 PG (ref 28.2–36.3)
SODIUM SERPL-SCNC: 139 MMOL/L (ref 136–144)
T AXIS: 71 DEGREES
TOTAL CELLS COUNTED: 100
TOTAL CELLS COUNTED: 100
TOTAL IRON BINDING CAPACITY: 165 UG/DL (ref 298–536)
TRANSFERRIN: 111 MG/DL (ref 200–360)
VENTRICULAR RATE: 123 BPM
WBC # BLD AUTO: 16.2 X10(3) UL (ref 4–13)
WBC # BLD AUTO: 16.4 X10(3) UL (ref 4–13)

## 2017-09-18 PROCEDURE — 99232 SBSQ HOSP IP/OBS MODERATE 35: CPT | Performed by: HOSPITALIST

## 2017-09-18 PROCEDURE — 78582 LUNG VENTILAT&PERFUS IMAGING: CPT | Performed by: INTERNAL MEDICINE

## 2017-09-18 PROCEDURE — 99232 SBSQ HOSP IP/OBS MODERATE 35: CPT | Performed by: INTERNAL MEDICINE

## 2017-09-18 RX ORDER — FUROSEMIDE 10 MG/ML
20 INJECTION INTRAMUSCULAR; INTRAVENOUS
Status: COMPLETED | OUTPATIENT
Start: 2017-09-18 | End: 2017-09-19

## 2017-09-18 RX ORDER — POTASSIUM CHLORIDE 14.9 MG/ML
20 INJECTION INTRAVENOUS ONCE
Status: COMPLETED | OUTPATIENT
Start: 2017-09-18 | End: 2017-09-18

## 2017-09-18 RX ORDER — CIPROFLOXACIN 2 MG/ML
400 INJECTION, SOLUTION INTRAVENOUS EVERY 24 HOURS
Status: DISCONTINUED | OUTPATIENT
Start: 2017-09-18 | End: 2017-09-21

## 2017-09-18 RX ORDER — FUROSEMIDE 10 MG/ML
20 INJECTION INTRAMUSCULAR; INTRAVENOUS ONCE
Status: COMPLETED | OUTPATIENT
Start: 2017-09-18 | End: 2017-09-18

## 2017-09-18 RX ORDER — POTASSIUM CHLORIDE 20 MEQ/1
40 TABLET, EXTENDED RELEASE ORAL EVERY 4 HOURS
Status: COMPLETED | OUTPATIENT
Start: 2017-09-18 | End: 2017-09-18

## 2017-09-18 NOTE — PROGRESS NOTES
[facility]  Progress Note     Mauri Sick Patient Status:  Inpatient    1921 MRN [id number]   [facility] 4SW-A Attending Miguel Acevedo MD   Hosp Day # 2 PCP Clarita Rangel     Chief Complaint: Diarrhea    S: Patient --    --   142            Imaging: Imaging data reviewed in Epic.     Medications:   • Potassium Chloride ER  40 mEq Oral Q4H   • meropenem  500 mg Intravenous Q12H   • Acidophilus/Pectin  1 capsule Oral Daily   • aspirin  81 mg Oral Daily   • Insulin Asp

## 2017-09-18 NOTE — PLAN OF CARE
CARDIOVASCULAR - ADULT    • Maintains optimal cardiac output and hemodynamic stability Progressing        GENITOURINARY - ADULT    • Absence of urinary retention Progressing        METABOLIC/FLUID AND ELECTROLYTES - ADULT    • Glucose maintained within pre

## 2017-09-18 NOTE — PROGRESS NOTES
[facility] [address] [place] [address] Cardiology  Progress Note    Jimi Floyd Patient Status:  Inpatient    1921 MRN [id number]   [facility] 4NW-A Attending Yue Rivera MD   Hosp Day # 2 PCP Verline Borders     Subjective:   No chest pain. acute distress. HEENT:  Normocephalic. Atraumatic. No icterus. Neck:  There is no jugular venous distention. Cardiovascular:  Cardiovascular examination demonstrates a mildly tachycardic rate with a regular rhythm. There is normal S1, S2.   There is Recent Labs   Lab  09/16/17   0933  09/16/17   1746  09/17/17   1214   TROP  0.121*  0.167*  0.135*   CK   --    --   142         Tele: ST    Diagnostics:    Echo:   Conclusions:    1. Left ventricle:  The cavity size was normal. Wall thickness was no

## 2017-09-18 NOTE — PROGRESS NOTES
[facility] Pharmacy Note:  Renal Adjustment for Merrem (meropenem)    Suzanne Miller is a 80year old female who has been prescribed Merrem (meropenem) 500 mg every 24 hrs.   CrCl is estimated creatinine clearance is 10.5 mL/min (based on SCr of 2.73 mg/dL (H))

## 2017-09-18 NOTE — PROGRESS NOTES
[address] [facility]  TEL: [phone]  FAX: [phone] [facility] Patient Status:  Inpatient    1921 MRN [id number]   [facility] 4NW-A Attending Shasta Posada MD   Hosp Day # 2 PCP Parmjit Batres 300*   --   326*   AST  35  35   --   51*   ALT  25  22   --   29   BILT  0.7  0.8   --   0.8   TP  6.1  5.2*   --   4.9*       Microbiology    Reviewed in EMR,   Bcx E coli 2/2  Repeat Bcx neg  Ucx same  Cdiff neg  Stool studies pend    Radiology: reviewe

## 2017-09-18 NOTE — OCCUPATIONAL THERAPY NOTE
Attempted to see pt for OT. Pt off the floor for VQ scan to R/o PE. Will re-attempt to see pt as appropriate and able.

## 2017-09-18 NOTE — PROGRESS NOTES
[facility]  Nephrology Progress Note    Kristen Moraes Patient Status:  Inpatient    1921 MRN [id number]   [facility] 4SW-A Attending Noble Staley MD   Hosp Day # 2 PCP Kareem RIBEIRO       SUBJECTIVE:  SOB yest and over Lab  09/16/17   0933  09/17/17   0444  09/18/17   0505   ALT  25  22  29   AST  35  35  51*   ALB  2.1*  1.7*  1.5*       Recent Labs   Lab  09/16/17   2111  09/17/17   0743  09/17/17   1712  09/17/17   2042  09/18/17   0721   PGLU  176*  148*  280*  216 replace    #5. Vol excess- IV lasix today      Questions/concerns were discussed with patient and/or family by bedside.         Monica Still MD  9/18/2017  10:58 AM

## 2017-09-18 NOTE — PLAN OF CARE
PATIENT TRANSFER FROM ICU, AND ORIENTED TO ROOM AND SURROUNDINGS, CALL LIGHT IN REACH. SIGN PLACED ABOVE BED LEGALLY BLIND, AND LIMB RESTRICTION TO RIGHT ARM. PATIENT ALERT TIMES FOUR, FORGETFUL, O2 ON PER NASAL CANNULA AT 2 LITERS.  PATIENT ON TELEMETRY SINU

## 2017-09-18 NOTE — PROGRESS NOTES
[id number] Cedrick Montanez Patient Status:  Inpatient    1921 MRN [id number]   [facility] 4NW-A Attending Radha Rivas MD   Hosp Day # 2 PCP Juan Alan     SUBJECTIVE: Pt became tachycardic and desaturated to 78% ov Oral, Q15 Min PRN **OR** Glucose-Vitamin C (DEX-4) 4-0.006 g chewable tab 8 tablet, 8 tablet, Oral, Q15 Min PRN  •  acetaminophen (TYLENOL) tab 650 mg, 650 mg, Oral, Q6H PRN  •  ondansetron HCl (ZOFRAN) injection 4 mg, 4 mg, Intravenous, Q6H PRN  •  Insuli ABGPHT  7.38   GHFSGM9X  24*   QDCUP9V  66*   ABGHCO3  13.9*   ABGBE  -9.8   TEMP  98.9   REANNA  Positive   SITE  Left Brachial   DEV  Room Air   THGB  9.1*     Recent Labs   Lab  09/16/17   0933  09/16/17   1746  09/17/17   1214   TROP  0.121*  0.167* for E.Coli bacteremia.  -cautious IVF  -VQ scan  2. Dyspnea: likely related in part to #1 as well as compensation for metabolic acidosis   -wean O2 as tolerated  3. Suggestion of pulmonary hypertension on echo: RVSP: 49mmHg. Unclear etiology.   Echo with G

## 2017-09-19 LAB
ALBUMIN SERPL-MCNC: 1.6 G/DL (ref 3.5–4.8)
ALP LIVER SERPL-CCNC: 310 U/L (ref 55–142)
ALT SERPL-CCNC: 46 U/L (ref 14–54)
AST SERPL-CCNC: 72 U/L (ref 15–41)
ATRIAL RATE: 102 BPM
BAND %: 5 %
BASOPHIL % MANUAL: 0 %
BASOPHIL ABSOLUTE MANUAL: 0 X10(3) UL (ref 0–0.1)
BILIRUB SERPL-MCNC: 0.9 MG/DL (ref 0.1–2)
BUN BLD-MCNC: 84 MG/DL (ref 8–20)
CALCIUM BLD-MCNC: 8 MG/DL (ref 8.3–10.3)
CHLORIDE: 105 MMOL/L (ref 101–111)
CO2: 21 MMOL/L (ref 22–32)
CREAT BLD-MCNC: 2.37 MG/DL (ref 0.55–1.02)
EOSINOPHIL % MANUAL: 2 %
EOSINOPHIL ABSOLUTE MANUAL: 0.28 X10(3) UL (ref 0–0.3)
ERYTHROCYTE [DISTWIDTH] IN BLOOD BY AUTOMATED COUNT: 14.3 % (ref 11.5–16)
GLUCOSE BLD-MCNC: 127 MG/DL (ref 65–99)
GLUCOSE BLD-MCNC: 168 MG/DL (ref 70–99)
GLUCOSE BLD-MCNC: 178 MG/DL (ref 65–99)
GLUCOSE BLD-MCNC: 313 MG/DL (ref 65–99)
HCT VFR BLD AUTO: 25.5 % (ref 34–50)
HGB BLD-MCNC: 8.8 G/DL (ref 12–16)
LYMPHOCYTE % MANUAL: 5 %
LYMPHOCYTE ABSOLUTE MANUAL: 0.69 X10(3) UL (ref 0.9–4)
M PROTEIN MFR SERPL ELPH: 5.2 G/DL (ref 6.1–8.3)
MCH RBC QN AUTO: 29 PG (ref 27–33.2)
MCHC RBC AUTO-ENTMCNC: 34.5 G/DL (ref 31–37)
MCV RBC AUTO: 84.2 FL (ref 81–100)
METAMYELOCYTE %: 1 %
METAMYELOCYTE ABSOLUTE MANUAL: 0.14 X10(3) UL (ref ?–0.01)
MONOCYTE % MANUAL: 7 %
MONOCYTE ABSOLUTE MANUAL: 0.97 X10(3) UL (ref 0.1–0.6)
NEUTROPHIL ABS PRELIM: 11.22 X10 (3) UL (ref 1.3–6.7)
NEUTROPHIL ABSOLUTE MANUAL: 11.73 X10(3) UL (ref 1.3–6.7)
NEUTROPHILS % MANUAL: 80 %
P AXIS: 34 DEGREES
P-R INTERVAL: 136 MS
PLATELET # BLD AUTO: 21 10(3)UL (ref 150–450)
POTASSIUM SERPL-SCNC: 4.2 MMOL/L (ref 3.6–5.1)
POTASSIUM SERPL-SCNC: 4.2 MMOL/L (ref 3.6–5.1)
Q-T INTERVAL: 362 MS
QRS DURATION: 82 MS
QTC CALCULATION (BEZET): 471 MS
R AXIS: 8 DEGREES
RBC # BLD AUTO: 3.03 X10(6)UL (ref 3.8–5.1)
RED CELL DISTRIBUTION WIDTH-SD: 43.5 FL (ref 35.1–46.3)
SODIUM SERPL-SCNC: 138 MMOL/L (ref 136–144)
T AXIS: 28 DEGREES
TOTAL CELLS COUNTED: 100
VENTRICULAR RATE: 102 BPM
WBC # BLD AUTO: 13.8 X10(3) UL (ref 4–13)

## 2017-09-19 PROCEDURE — 99232 SBSQ HOSP IP/OBS MODERATE 35: CPT | Performed by: HOSPITALIST

## 2017-09-19 PROCEDURE — 99223 1ST HOSP IP/OBS HIGH 75: CPT | Performed by: INTERNAL MEDICINE

## 2017-09-19 PROCEDURE — 99232 SBSQ HOSP IP/OBS MODERATE 35: CPT | Performed by: INTERNAL MEDICINE

## 2017-09-19 RX ORDER — FUROSEMIDE 10 MG/ML
20 INJECTION INTRAMUSCULAR; INTRAVENOUS ONCE
Status: DISCONTINUED | OUTPATIENT
Start: 2017-09-19 | End: 2017-09-19

## 2017-09-19 NOTE — PROGRESS NOTES
[facility] Cardiology  Progress Note    Umer Fall Patient Status:  Inpatient    1921 MRN [id number]   [facility] 4NW-A Attending Ravinder Keller MD   Hosp Day # 3 PCP Jimi RIBEIRO     Subjective:   No chest pain. AXETIL    Physical Exam:   General:  Well-developed / Well-nourished. No acute distress. HEENT:  Normocephalic. Atraumatic. No icterus. Neck:  There is no jugular venous distention.    Cardiovascular:  Cardiovascular examination demonstrates a mildly t 16.4*  16.2*  13.8*   PLT  16.0*  18.0*  21.0*       Recent Labs   Lab  09/18/17   1248   PTP  14.1   INR  1.09       Recent Labs   Lab  09/17/17   1214   TROP  0.135*   CK  142         Tele: ST    Diagnostics:    Echo:   Conclusions:    1.  Left ventricle:

## 2017-09-19 NOTE — PHYSICAL THERAPY NOTE
PHYSICAL THERAPY EVALUATION - INPATIENT     Room Number: 408/408-A  Evaluation Date: 9/19/2017  Type of Evaluation: Initial  Physician Order: PT Eval and Treat    Presenting Problem: Acute cystitis c hematuria, UTI, sepsis c hypotension, ARF and diarrh chair, grab bars-shower/toilet)  Patient Regularly Uses: Reading glasses    Prior Level of [place]: Pt reports that she is indep c dressing/bathing and her nephew assists with getting her medication prepared; pt amb to the dining rdz 2x/day for her m blankets)?: A Little   -   Sitting down on and standing up from a chair with arms (e.g., wheelchair, bedside commode, etc.): A Little   -   Moving from lying on back to sitting on the side of the bed?: A Little   How much help from another person does the within reach;RN aware of session/findings; All patient questions and concerns addressed;SCDs in place; Discussed recommendations with /    ASSESSMENT   Patient is a 80year old female admitted on 9/16/2017 for acute cystitis c hematu supervision     Goal #2 Patient is able to demonstrate transfers Sit to/from Stand at assistance level: supervision     Goal #3 Patient is able to ambulate 30 feet with assist device: walker - rolling at assistance level: minimum assistance     Goal #4

## 2017-09-19 NOTE — PROGRESS NOTES
[facility]  Progress Note     Kalpana Sam Patient Status:  Inpatient    1921 MRN [id number]   [facility] 4SW-A Attending Gaetano Lucas MD   Hosp Day # 3 PCP Efrain Flores     Chief Complaint: Diarrhea    S: Patient Estimated Creatinine Clearance: 12.9 mL/min (based on SCr of 2.37 mg/dL (H)).     Recent Labs   Lab  09/18/17   1248   PTP  14.1   INR  1.09       Recent Labs   Lab  09/16/17   1746  09/17/17   1214   TROP  0.167*  0.135*   CK   --   142            Im

## 2017-09-19 NOTE — PLAN OF CARE
CARDIOVASCULAR - ADULT    • Maintains optimal cardiac output and hemodynamic stability Progressing        GASTROINTESTINAL - ADULT    • Maintains or returns to baseline bowel function Progressing        GENITOURINARY - ADULT    • Absence of urinary retenti

## 2017-09-19 NOTE — OCCUPATIONAL THERAPY NOTE
OCCUPATIONAL THERAPY EVALUATION - INPATIENT     Room Number: 408/408-A  Evaluation Date: 9/19/2017  Type of Evaluation: Initial  Presenting Problem: UTI, fall, L leg infection, sepsis, acute respiratory failure    Physician Order: IP Consult to [facility] Occupation/Status: retired     Drives: No  Patient Regularly Uses: Reading glasses    Prior Level of Function: Pt reports independence with ADL and ambulation with RW prior to admit. Pt has assist from her nephew for medication management.  She walks to Toileting, which includes using toilet, bedpan or urinal? : A Lot  -   Putting on and taking off regular upper body clothing?: A Little  -   Taking care of personal grooming such as brushing teeth?: A Little  -   Eating meals?: None    AM-PAC Score:  Score and social participation. The patient is functioning below her previous functional level and would benefit from skilled inpatient OT to address the above deficits, maximizing patient’s ability to return safely to her prior level of function.   Subacute Program Goal  Patient will be supervision with bilateral AROM HEP (home exercise program).     Additional Goals:  Pt will stand 5 min during light ADL with one seated rest break prn  Pt will state 3 energy conservation techniques to incorporate into ADL

## 2017-09-19 NOTE — CM/SW NOTE
SW followed up w/pt regarding PT recommendation. Pt agreeable to VICKI. Pt stated she would like to go to [facility] since she lives at [facility] independent living. SW called in DON screen. Referral sent via [facility] [address].

## 2017-09-19 NOTE — PROGRESS NOTES
[facility]  TEL: [phone]  FAX: [phone] [facility] Patient Status:  Inpatient    1921 MRN [id number]   [facility] 4NW-A Attending Meenakshi García MD   Hosp Day # 3 PCP Moshe Mcintyre 46   BILT  0.8   --   0.8   --   0.9   TP  5.2*   --   4.9*   --   5.2*    < > = values in this interval not displayed.        Microbiology    Reviewed in EMR,   Bcx E coli 2/2  Repeat Bcx neg  Ucx same  Cdiff neg  Stool studies pend    Radiology: reviewed

## 2017-09-19 NOTE — PROGRESS NOTES
Pulmonary Progress Note     Assessment / Plan:  1. Hypoxemic respiratory failure: likely related to volume overload in the setting of recent resuscitation for sepsis and bicarb gtt vs PE given underlying malignancy.  Less likely progressive pneumonia as pt

## 2017-09-19 NOTE — PROGRESS NOTES
[facility]  Nephrology Progress Note    Toby Mckeon Patient Status:  Inpatient    1921 MRN [id number]   [facility] 4SW-A Attending Carolynn Vines MD   Hosp Day # 3 PCP Alicia Michaud       SUBJECTIVE:  Feels better toda BUN  90*   --   86*   --   84*   CREATSERUM  2.99*   --   2.73*   --   2.37*   CA  7.5*   --   7.3*   --   8.0*   GLU  171*   --   196*   --   168*       Recent Labs   Lab  09/17/17   0444  09/18/17   0505  09/19/17   0545   ALT  22  29  46   AST  35  51 Metabolic acidosis- due to lactic acidosis/JONAS/diarrhea as outpt. Improved. Off bicarb gtt    #4. Hypokalemia- replace    #5. Vol excess- IV lasix today      Questions/concerns were discussed with patient and/or family by bedside.         Terell Whiting

## 2017-09-19 NOTE — CONSULTS
[facility]    Report of Consultation    Angela Fisher Patient Status:  Inpatient    1921 MRN [id number]   [facility] 4NW-A Attending Charlie Winkler MD   Hosp Day # 3 PCP Roe Mejia     Date of Admission:  2017 reports that she drinks about 0.5 oz of alcohol per week .     Allergies:    Clindamycin             Diarrhea  Ceftin                      Comment:Other reaction(s): CEFUROXIME AXETIL    Medications:    Current Facility-Administered Medications:   •  furose (Oral)   Resp 20   Ht 1.473 m (4' 10\")   Wt 58.8 kg (129 lb 11.2 oz)   SpO2 92%   BMI 27.11 kg/m²    HEENT: EOMs intact. PERRL. Oropharynx is clear. Neck: No JVD. No palpable lymphadenopathy. Neck is supple. Chest: Clear to auscultation.    Heart: Reg encounter     Contusion of leg, right, initial encounter     Chest wall contusion, left, initial encounter     Hypokalemia     Type 2 diabetes mellitus without complication, without long-term current use of insulin (HCC)     Essential hypertension     C. d

## 2017-09-20 LAB
ALLENS TEST: POSITIVE
ARTERIAL BLD GAS O2 SATURATION: 96 % (ref 92–100)
ARTERIAL BLOOD GAS BASE EXCESS: -3.2
ARTERIAL BLOOD GAS HCO3: 21 MEQ/L (ref 22–26)
ARTERIAL BLOOD GAS PCO2: 33 MM HG (ref 35–45)
ARTERIAL BLOOD GAS PH: 7.42 (ref 7.35–7.45)
ARTERIAL BLOOD GAS PO2: 97 MM HG (ref 80–105)
BASOPHIL % MANUAL: 0 %
BASOPHIL ABSOLUTE MANUAL: 0 X10(3) UL (ref 0–0.1)
BUN BLD-MCNC: 83 MG/DL (ref 8–20)
CALCIUM BLD-MCNC: 7.9 MG/DL (ref 8.3–10.3)
CALCULATED O2 SATURATION: 98 % (ref 92–100)
CARBOXYHEMOGLOBIN: 1.2 % SAT (ref 0–3)
CHLORIDE: 107 MMOL/L (ref 101–111)
CO2: 24 MMOL/L (ref 22–32)
CREAT BLD-MCNC: 2.2 MG/DL (ref 0.55–1.02)
EOSINOPHIL % MANUAL: 4 %
EOSINOPHIL ABSOLUTE MANUAL: 0.47 X10(3) UL (ref 0–0.3)
ERYTHROCYTE [DISTWIDTH] IN BLOOD BY AUTOMATED COUNT: 14.6 % (ref 11.5–16)
GLUCOSE BLD-MCNC: 104 MG/DL (ref 70–99)
GLUCOSE BLD-MCNC: 167 MG/DL (ref 65–99)
GLUCOSE BLD-MCNC: 198 MG/DL (ref 65–99)
GLUCOSE BLD-MCNC: 93 MG/DL (ref 65–99)
HCT VFR BLD AUTO: 24.9 % (ref 34–50)
HGB BLD-MCNC: 8.4 G/DL (ref 12–16)
L/M: 3 L/MIN
LYMPHOCYTE % MANUAL: 8 %
LYMPHOCYTE ABSOLUTE MANUAL: 0.94 X10(3) UL (ref 0.9–4)
MCH RBC QN AUTO: 29 PG (ref 27–33.2)
MCHC RBC AUTO-ENTMCNC: 33.7 G/DL (ref 31–37)
MCV RBC AUTO: 85.9 FL (ref 81–100)
METHEMOGLOBIN: 0.6 % SAT (ref 0.4–1.5)
MONOCYTE % MANUAL: 7 %
MONOCYTE ABSOLUTE MANUAL: 0.83 X10(3) UL (ref 0.1–0.6)
NEUTROPHIL ABS PRELIM: 8.68 X10 (3) UL (ref 1.3–6.7)
NEUTROPHIL ABSOLUTE MANUAL: 9.56 X10(3) UL (ref 1.3–6.7)
NEUTROPHILS % MANUAL: 81 %
PATIENT TEMPERATURE: 97.8 F
PLATELET # BLD AUTO: 30 10(3)UL (ref 150–450)
PLATELET MORPHOLOGY: NORMAL
POTASSIUM SERPL-SCNC: 4.7 MMOL/L (ref 3.6–5.1)
RBC # BLD AUTO: 2.9 X10(6)UL (ref 3.8–5.1)
RED CELL DISTRIBUTION WIDTH-SD: 45.4 FL (ref 35.1–46.3)
SODIUM SERPL-SCNC: 140 MMOL/L (ref 136–144)
TOTAL CELLS COUNTED: 100
TOTAL HEMOGLOBIN: 8.2 G/DL (ref 11.7–16)
WBC # BLD AUTO: 11.8 X10(3) UL (ref 4–13)

## 2017-09-20 PROCEDURE — 99232 SBSQ HOSP IP/OBS MODERATE 35: CPT | Performed by: INTERNAL MEDICINE

## 2017-09-20 PROCEDURE — 99232 SBSQ HOSP IP/OBS MODERATE 35: CPT | Performed by: CLINICAL NURSE SPECIALIST

## 2017-09-20 PROCEDURE — 99232 SBSQ HOSP IP/OBS MODERATE 35: CPT | Performed by: HOSPITALIST

## 2017-09-20 RX ORDER — FUROSEMIDE 20 MG/1
20 TABLET ORAL DAILY
Status: DISCONTINUED | OUTPATIENT
Start: 2017-09-20 | End: 2017-09-21

## 2017-09-20 RX ORDER — METOPROLOL SUCCINATE 25 MG/1
25 TABLET, EXTENDED RELEASE ORAL
Status: DISCONTINUED | OUTPATIENT
Start: 2017-09-20 | End: 2017-09-21

## 2017-09-20 NOTE — PLAN OF CARE
POA came to visit pt this afternoon to break the news of her sisters death. Shell Mitchell was called in for support. Pt reaction appropriate for situation.

## 2017-09-20 NOTE — CM/SW NOTE
DARNELL spoke with ST HOOD at [facility] who confirmed that pt was accepted to [facility].  [facility] requested to be updated regarding pt's d/c. SW to follow up.

## 2017-09-20 NOTE — PROGRESS NOTES
[facility] HOSPITALIST  Progress Note     Pinky May Patient Status:  Inpatient    1921 MRN [id number]   [facility] 4SW-A Attending Kathia Wolf MD   Hosp Day # 4 PCP Stacey Cheng     Chief Complaint: Diarrhea    S: Patient 09/18/17   1248   PTP  14.1   INR  1.09       Recent Labs   Lab  09/17/17   1214   TROP  0.135*   CK  142            Imaging: Imaging data reviewed in Epic.     Medications:   • furosemide  20 mg Oral Daily   • iron sucrose  200 mg Intravenous Daily   • ins

## 2017-09-20 NOTE — PROGRESS NOTES
[facility] [address] [facility] Cardiology  Progress Note    Elzbieta Age Patient Status:  Inpatient    1921 MRN [id number]   [facility] 4NW-A Attending Katie Jeffers MD   Hosp Day # 4 PCP ATLURI, Rise Klinefelter     Subjective:   No chest pain. AXETIL    Physical Exam:   General:  Well-developed / Well-nourished. No acute distress. HEENT:  Normocephalic. Atraumatic. No icterus. Neck:  There is no jugular venous distention.    Cardiovascular:  Cardiovascular examination demonstrates a mildly t Lab  09/17/17   1214   TROP  0.135*   CK  142         Tele: ST    Diagnostics:    Echo:   Conclusions:    1. Left ventricle:  The cavity size was normal. Wall thickness was normal.     Systolic function was normal. The estimated ejection fraction was 55-6

## 2017-09-20 NOTE — PROGRESS NOTES
[facility]    Progress Note    Kalpana Sam Patient Status:  Inpatient    1921 MRN [id number]   [facility] 4NW-A Attending Gaetano Lucas MD   Hosp Day # 4 PCP Efrain Flores       Subjective:   Patient states that she is (H) 09/20/2017   BUN 83 (H) 09/20/2017    09/20/2017   K 4.7 09/20/2017    09/20/2017   CO2 24.0 09/20/2017    (H) 09/20/2017   CA 7.9 (L) 09/20/2017   ALB 1.6 (L) 09/19/2017   ALKPHO 310 (H) 09/19/2017   BILT 0.9 09/19/2017   TP 5.2 (L) by: Sharon Cobb MD            Ekg 12-lead    Result Date: 9/19/2017  Sinus tachycardia with Premature atrial complexes Otherwise normal ECG When compared with ECG of 17-SEP-2017 21:00, Nonspecific T wave abnormality, improved in Inferior leads Nonspecif

## 2017-09-20 NOTE — OCCUPATIONAL THERAPY NOTE
OCCUPATIONAL THERAPY TREATMENT NOTE - INPATIENT     Room Number: 408/408-A  Session: 1   Number of Visits to Meet Established Goals: 5    Presenting Problem: UTI, fall, L leg infection, sepsis, acute respiratory failure    History related to current admiss PAIN ASSESSMENT  Ratin  Location: none reported  Management Techniques: Activity promotion; Body mechanics; Relaxation;Repositioning     ACTIVITY TOLERANCE  Room air    ACTIVITIES OF DAILY LIVING ASSESSMENT  AM-PAC ‘6-Clicks’ Inpatient Daily Activity hospital setting to increase independence with ADL/functional transfers. Subacute rehab is recommended for 14-17 days. After this period of rehabilitation patient should achieve supervision-mod (I) level in ADL and transfers.     OT Discharge Recommend

## 2017-09-20 NOTE — PLAN OF CARE
Pt alert and oriented x4. VSS and afebrile. Pt Up with assist and with a walker, slightly unsteady. No BM overnight. All needs attended to. Call light within reach. Will continue to monitor.

## 2017-09-20 NOTE — PROGRESS NOTES
[facility]  Nephrology Progress Note    Wade Talamantes Patient Status:  Inpatient    1921 MRN [id number]   [facility] 4SW-A Attending Erick Espino MD   Hosp Day # 4 PCP Claire RIBEIRO       SUBJECTIVE:  Up in chair this --   105  107   CO2   --   21.0*   --   21.0*  24.0   BUN   --   86*   --   84*  83*   CREATSERUM   --   2.73*   --   2.37*  2.20*   CA   --   7.3*   --   8.0*  7.9*   GLU   --   196*   --   168*  104*       Recent Labs   Lab  09/18/17   0505  09/19/17   0 prerenal azotemia in setting of sepsis. Creat cont to improve    #3. Metabolic acidosis- due to lactic acidosis/JONAS/diarrhea as outpt. Improved. Off bicarb gtt    #4. Hypokalemia- replace    #5. Vol excess- better.   Transition to po lasix      Curtistine Free

## 2017-09-20 NOTE — PROGRESS NOTES
[facility]    Progress Note    Faith Herbert Patient Status:  Inpatient    1921 MRN [id number]   [facility] 4NW-A Attending Mary Loera MD   Hosp Day # 4 PCP Bannock Monday       Subjective:   Patient states that she is BUN 83 (H) 09/20/2017    09/20/2017   K 4.7 09/20/2017    09/20/2017   CO2 24.0 09/20/2017    (H) 09/20/2017   CA 7.9 (L) 09/20/2017   ALB 1.6 (L) 09/19/2017   ALKPHO 310 (H) 09/19/2017   BILT 0.9 09/19/2017   TP 5.2 (L) 09/19/2017   A SONJA Torres, RN, BSN, OCN  APN student  9/20/2017    I concur with the APN student's assessment and plan. I saw the patient separately. Ms. Jessica Vasquez denies SOB. She is on room air at this visit with O2 sat 97%.   She had transient epistaxis assoc

## 2017-09-20 NOTE — PROGRESS NOTES
[facility]  TEL: [phone]  FAX: [phone] [facility] Patient Status:  Inpatient    1921 MRN [id number]   [facility] 4NW-A Attending Sherita Daugherty MD   Hosp Day # 4 PCP Gualberto Deluca --        Microbiology    Reviewed in EMR,   Bcx E coli 2/2  Repeat Bcx neg  Ucx same  Cdiff neg  Stool studies pend (not sent?)    Radiology: reviewed     ASSESSMENT:     1. E coli UTI with BSI and septic shock- better, sepsis resolved.      2.  Acute hy

## 2017-09-20 NOTE — CM/SW NOTE
SW spoke w/pt's Mary Alberto. Informed him the pt has been accepted to [facility]. Annamarie Daily stated, \"That's exactly what we wanted to happen. \" Annamarie Daily informed SW he is coming to the hospital between 2 and 3 pm to inform the pt her sister passed away ye

## 2017-09-20 NOTE — PHYSICAL THERAPY NOTE
PHYSICAL THERAPY TREATMENT NOTE - INPATIENT    Room Number: 408/408-A     Session: 1   Number of Visits to Meet Established Goals: 5    Presenting Problem: Acute cystitis c hematuria, UTI, sepsis c hypotension, ARF and diarrhea       History related to cu LBP R side  Management Techniques: Activity promotion; Body mechanics;Breathing techniques;Relaxation;Repositioning    BALANCE                                                                                                                     Static Sitting Pt left in bed, needs met. Educated pt on importance of positional changes to manage back pain and to t/f to chair 3x /day with nsg staff. Pt verbalizes understanding.      THERAPEUTIC EXERCISES  Lower Extremity Ankle pumps     Upper Extremity      Position Goal Comments: Goals established on 9/19/2017 progressing

## 2017-09-21 VITALS
DIASTOLIC BLOOD PRESSURE: 58 MMHG | TEMPERATURE: 98 F | HEART RATE: 79 BPM | BODY MASS INDEX: 27.88 KG/M2 | SYSTOLIC BLOOD PRESSURE: 120 MMHG | HEIGHT: 58 IN | RESPIRATION RATE: 20 BRPM | OXYGEN SATURATION: 93 % | WEIGHT: 132.81 LBS

## 2017-09-21 LAB
BASOPHILS # BLD AUTO: 0.02 X10(3) UL (ref 0–0.1)
BASOPHILS NFR BLD AUTO: 0.1 %
BUN BLD-MCNC: 76 MG/DL (ref 8–20)
CALCIUM BLD-MCNC: 7.9 MG/DL (ref 8.3–10.3)
CHLORIDE: 105 MMOL/L (ref 101–111)
CO2: 23 MMOL/L (ref 22–32)
CREAT BLD-MCNC: 2.09 MG/DL (ref 0.55–1.02)
EOSINOPHIL # BLD AUTO: 0.31 X10(3) UL (ref 0–0.3)
EOSINOPHIL NFR BLD AUTO: 2 %
ERYTHROCYTE [DISTWIDTH] IN BLOOD BY AUTOMATED COUNT: 14.6 % (ref 11.5–16)
GLUCOSE BLD-MCNC: 131 MG/DL (ref 65–99)
GLUCOSE BLD-MCNC: 173 MG/DL (ref 65–99)
GLUCOSE BLD-MCNC: 186 MG/DL (ref 65–99)
GLUCOSE BLD-MCNC: 201 MG/DL (ref 65–99)
GLUCOSE BLD-MCNC: 78 MG/DL (ref 65–99)
GLUCOSE BLD-MCNC: 83 MG/DL (ref 70–99)
HCT VFR BLD AUTO: 23.2 % (ref 34–50)
HGB BLD-MCNC: 8 G/DL (ref 12–16)
IMMATURE GRANULOCYTE COUNT: 0.42 X10(3) UL (ref 0–1)
IMMATURE GRANULOCYTE RATIO %: 2.7 %
LYMPHOCYTES # BLD AUTO: 1.32 X10(3) UL (ref 0.9–4)
LYMPHOCYTES NFR BLD AUTO: 8.6 %
MCH RBC QN AUTO: 29.4 PG (ref 27–33.2)
MCHC RBC AUTO-ENTMCNC: 34.5 G/DL (ref 31–37)
MCV RBC AUTO: 85.3 FL (ref 81–100)
MONOCYTES # BLD AUTO: 0.79 X10(3) UL (ref 0.1–0.6)
MONOCYTES NFR BLD AUTO: 5.1 %
NEUTROPHIL ABS PRELIM: 12.54 X10 (3) UL (ref 1.3–6.7)
NEUTROPHILS # BLD AUTO: 12.54 X10(3) UL (ref 1.3–6.7)
NEUTROPHILS NFR BLD AUTO: 81.5 %
PLATELET # BLD AUTO: 52 10(3)UL (ref 150–450)
POTASSIUM SERPL-SCNC: 4.4 MMOL/L (ref 3.6–5.1)
RBC # BLD AUTO: 2.72 X10(6)UL (ref 3.8–5.1)
RED CELL DISTRIBUTION WIDTH-SD: 45.5 FL (ref 35.1–46.3)
SODIUM SERPL-SCNC: 139 MMOL/L (ref 136–144)
WBC # BLD AUTO: 15.4 X10(3) UL (ref 4–13)

## 2017-09-21 PROCEDURE — 99232 SBSQ HOSP IP/OBS MODERATE 35: CPT | Performed by: INTERNAL MEDICINE

## 2017-09-21 PROCEDURE — 99232 SBSQ HOSP IP/OBS MODERATE 35: CPT | Performed by: CLINICAL NURSE SPECIALIST

## 2017-09-21 PROCEDURE — 99239 HOSP IP/OBS DSCHRG MGMT >30: CPT | Performed by: HOSPITALIST

## 2017-09-21 RX ORDER — CIPROFLOXACIN 500 MG/1
500 TABLET, FILM COATED ORAL DAILY
Qty: 10 TABLET | Refills: 0 | Status: SHIPPED | OUTPATIENT
Start: 2017-09-21 | End: 2017-10-01

## 2017-09-21 RX ORDER — LIDOCAINE 50 MG/G
1 PATCH TOPICAL EVERY 24 HOURS
Status: DISCONTINUED | OUTPATIENT
Start: 2017-09-21 | End: 2017-09-21

## 2017-09-21 RX ORDER — LIDOCAINE 50 MG/G
1 PATCH TOPICAL EVERY 24 HOURS
Qty: 7 PATCH | Refills: 0 | Status: SHIPPED | OUTPATIENT
Start: 2017-09-21

## 2017-09-21 NOTE — CM/SW NOTE
09/21/17 1000   Discharge disposition   Discharged to: Skilled Nurs   Name of Facillity/Home Care/Hospice  [facility]   Discharge transportation [address] arranged for [employer] ambulance to transport pt to [facility]

## 2017-09-21 NOTE — PLAN OF CARE
GENITOURINARY - ADULT    • Absence of urinary retention Progressing        PAIN - ADULT    • Verbalizes/displays adequate comfort level or patient's stated pain goal Progressing        SAFETY ADULT - FALL    • Free from fall injury Progressing        SKIN/

## 2017-09-21 NOTE — PROGRESS NOTES
Pulmonary Progress Note      NAME: Luis Matos - ROOM: 169/275-E - MRN: [id number] - Age: 80year old - : 1921    Assessment/Plan:  1.  Hypoxemic respiratory failure: likely related to volume overload in the setting of recent resuscitation for Ht 147.3 cm (4' 10\")   Wt 132 lb 12.8 oz (60.2 kg)   SpO2 93%   BMI 27.76 kg/m²   Physical Exam:   General: alert, cooperative, no respiratory distress. HEENT: Normocephalic atraumatic. Lips, mucosa, and tongue normal.  No thrush noted.    Lungs: diminis

## 2017-09-21 NOTE — PROGRESS NOTES
Dr. Jeanette Dumont and dr. Kaelyn Foster rounded this am;per dr. Mitchel Almanzar.  Can be discharge;dr. Deepak Oliva to do oral abt;informed soc.worker of plan for disch;paged and notified dr. Solis Cota cardio at this time;  Spoken to nephew daniel;per POA he will  tyrese

## 2017-09-21 NOTE — PROGRESS NOTES
[facility]ITAL[facility]  Progress Note     Gurmeet Handy Patient Status:  Inpatient    1921 MRN [id number]   [facility] 4SW-A Attending Shasta Posada MD   Hosp Day # 5 PCP Mary Alvarado     Chief Complaint: Diarrhea    S: Patient furosemide  20 mg Oral Daily   • metoprolol succinate  25 mg Oral Daily Beta Blocker   • iron sucrose  200 mg Intravenous Daily   • insulin detemir  10 Units Subcutaneous Daily   • Miconazole Nitrate   Topical [email or web address]   • Ciprofloxacin in D5W  400 mg

## 2017-09-21 NOTE — PROGRESS NOTES
[facility]  Nephrology Progress Note    Mauri Sick Patient Status:  Inpatient    1921 MRN [id number]   [facility] 4SW-A Attending Emery Lawson MD   Hosp Day # 5 PCP Steven RIBEIRO       SUBJECTIVE:  Up in chair this 2.37*  2.20*  2.09*   CA   --   8.0*  7.9*  7.9*   GLU   --   168*  104*  83       Recent Labs   Lab  09/19/17   0545   ALT  46   AST  72*   ALB  1.6*       Recent Labs   Lab  09/20/17   0733  09/20/17   1222  09/20/17   1652  09/20/17   2114  09/21/17   0 of sepsis. Creat cont to improve    #3. Vol excess- resolved, OK to d/c loop diuretic    #4. HTN- OK to resume usual ARB/HCTZ      Questions/concerns were discussed with patient and/or family by bedside.         Aniyah Graham MD  9/21/2017  9:01 AM

## 2017-09-21 NOTE — DIETARY NOTE
Nutrition Short Note    Pt with request for diet education for DM. Appropriate education and handout provided. Discussed using the Plate Method for carb control/portion sizes/balanced diet. Pt receptive, expect compliance.  All questions answered and RD co

## 2017-09-21 NOTE — PROGRESS NOTES
NURSING DISCHARGE NOTE    Discharged Rehab facility via Wheelchair. Accompanied by Family member  Belongings Taken by patient/family.

## 2017-09-21 NOTE — OCCUPATIONAL THERAPY NOTE
OCCUPATIONAL THERAPY TREATMENT NOTE - INPATIENT     Room Number: 408/408-A  Session: 2   Number of Visits to Meet Established Goals: 5    Presenting Problem: UTI, fall, L leg infection, sepsis, acute respiratory failure    History related to current admiss Repositioning     ACTIVITY TOLERANCE  Room air    ACTIVITIES OF DAILY LIVING ASSESSMENT  AM-PAC ‘6-Clicks’ Inpatient Daily Activity Short Form  How much help from another person does the patient currently need…  -   Putting on and taking off regular lower Discharge Recommendations: Sub-acute rehabilitation (ELOS 14-17 days)  OT Device Recommendations: TBD    PLAN  OT Treatment Plan: Balance activities; Energy conservation/work simplification techniques;ADL training;Functional transfer training;UE strengtheni

## 2017-09-21 NOTE — CM/SW NOTE
SW met w/pt at request of RN. Pt had questions regarding d/c plan and SW informed pt that [facility] Perrymorgan will be picking pt up at 3pm per RN. Pt presented as anxious about plan and SW provided emotional support. Pt is aware of VICKI plan and is agreeable.  SW co

## 2017-09-21 NOTE — PROGRESS NOTES
[address] [place] [address]  TEL: [phone]  FAX: [phone] Patient Status:  Inpatient    1921 MRN [id number]   [facility] 4NW-A Attending Gutierrez Carson MD   Hosp Day # 5 PCP Bulmaro Painter 2/2  Repeat Bcx neg  Ucx same  Cdiff neg  Stool studies pend (not sent?)    Radiology: reviewed     ASSESSMENT:     1. E coli UTI with BSI and septic shock- better, sepsis resolved.      2.  Acute hypoxic resp failure- agree that more likely due to fluid OL

## 2017-09-21 NOTE — PROGRESS NOTES
[facility]    Progress Note    Cathy Galicia Patient Status:  Inpatient    1921 MRN [id number]   [facility] 4NW-A Attending Corona Urrutia MD   Hosp Day # 5 PCP Cecily RIBEIRO       Subjective:   No bleeding.   No new compl - 13.0 x10(3) uL   RBC 2.72 (L) 3.80 - 5.10 x10(6)uL   HGB 8.0 (L) 12.0 - 16.0 g/dL   HCT 23.2 (L) 34.0 - 50.0 %   PLT 52.0 (L) 150.0 - 450.0 10(3)uL   MCV 85.3 81.0 - 100.0 fL   MCH 29.4 27.0 - 33.2 pg   MCHC 34.5 31.0 - 37.0 g/dL   RDW 14.6 11.5 - 16.0 % 12-lead    Result Date: 9/19/2017  Sinus tachycardia with Premature atrial complexes Otherwise normal ECG When compared with ECG of 17-SEP-2017 21:00, Nonspecific T wave abnormality, improved in Inferior leads Nonspecific T wave abnormality no longer evide

## 2017-09-21 NOTE — PHYSICAL THERAPY NOTE
PHYSICAL THERAPY TREATMENT NOTE - INPATIENT    Room Number: 408/408-A     Session: 2  Number of Visits to Meet Established Goals: 5    Presenting Problem: Acute cystitis c hematuria, UTI, sepsis c hypotension, ARF and diarrhea       History related to cur Ratin  Location: LBP  Management Techniques: Activity promotion;Breathing techniques;Repositioning; Body mechanics    BALANCE                                                                                                                     Static Si Patient End of Session: Up in chair;Needs met;Call light within reach;RN aware of session/findings; All patient questions and concerns addressed    ASSESSMENT   Pt seen for gait training, active flexibility and BLE strengthening, due to [facility]

## 2017-09-22 NOTE — DISCHARGE SUMMARY
[facility] HOSPITALIST  DISCHARGE SUMMARY     Petty Lunsford Patient Status:  Inpatient    1921 MRN [id number]   [facility] 4NW-A Attending No att. providers found   Hosp Day # 5 PCP Sammi De Anda     Date of Admission: 2017 broad-spectrum IV antibiotics. Initially she complained of diarrhea however stool testing was negative for C. difficile. Patient's blood and urine cultures returned back positive for E. coli. Repeat cultures were negative.   Patient's antibiotics were na route  every day   Refills:  0     atorvastatin 10 MG Tabs  Commonly known as:  LIPITOR      TAKE 1 TABLET (10MG)  BY ORAL ROUTE  EVERY DAY   Quantity:  90 tablet  Refills:  3     Calcium 500-125 MG-UNIT Tabs      Take 600 mg by mouth daily.    Refills:  0 gallops. Abdomen: Soft, nontender, nondistended. Positive bowel sounds. No rebound or guarding. Neurologic: No focal neurological deficits. Musculoskeletal: Moves all extremities. Extremities: No edema.   ---------------------------------------------

## 2017-11-11 ENCOUNTER — HOSPITAL ENCOUNTER (INPATIENT)
Facility: HOSPITAL | Age: 82
LOS: 10 days | Discharge: NURSING, REHAB OR LONG-TERM CARE | DRG: 987 | End: 2017-11-21
Attending: EMERGENCY MEDICINE | Admitting: HOSPITALIST
Payer: MEDICARE

## 2017-11-11 DIAGNOSIS — G06.2 EPIDURAL ABSCESS: ICD-10-CM

## 2017-11-11 DIAGNOSIS — M54.50 ACUTE EXACERBATION OF CHRONIC LOW BACK PAIN: Primary | ICD-10-CM

## 2017-11-11 DIAGNOSIS — G89.29 ACUTE EXACERBATION OF CHRONIC LOW BACK PAIN: Primary | ICD-10-CM

## 2017-11-11 PROCEDURE — 84145 PROCALCITONIN (PCT): CPT | Performed by: EMERGENCY MEDICINE

## 2017-11-11 PROCEDURE — 83036 HEMOGLOBIN GLYCOSYLATED A1C: CPT | Performed by: INTERNAL MEDICINE

## 2017-11-11 PROCEDURE — 36415 COLL VENOUS BLD VENIPUNCTURE: CPT

## 2017-11-11 PROCEDURE — 99285 EMERGENCY DEPT VISIT HI MDM: CPT

## 2017-11-11 PROCEDURE — 87040 BLOOD CULTURE FOR BACTERIA: CPT | Performed by: EMERGENCY MEDICINE

## 2017-11-11 PROCEDURE — 80048 BASIC METABOLIC PNL TOTAL CA: CPT | Performed by: EMERGENCY MEDICINE

## 2017-11-11 PROCEDURE — 96361 HYDRATE IV INFUSION ADD-ON: CPT

## 2017-11-11 PROCEDURE — 87641 MR-STAPH DNA AMP PROBE: CPT | Performed by: EMERGENCY MEDICINE

## 2017-11-11 PROCEDURE — 85025 COMPLETE CBC W/AUTO DIFF WBC: CPT | Performed by: EMERGENCY MEDICINE

## 2017-11-11 PROCEDURE — 96360 HYDRATION IV INFUSION INIT: CPT

## 2017-11-11 PROCEDURE — 82962 GLUCOSE BLOOD TEST: CPT

## 2017-11-11 RX ORDER — HYDROCODONE BITARTRATE AND ACETAMINOPHEN 5; 325 MG/1; MG/1
1 TABLET ORAL 2 TIMES DAILY
COMMUNITY
End: 2017-11-21

## 2017-11-11 RX ORDER — MIRTAZAPINE 15 MG/1
7.5 TABLET, FILM COATED ORAL NIGHTLY
Status: DISCONTINUED | OUTPATIENT
Start: 2017-11-11 | End: 2017-11-21

## 2017-11-11 RX ORDER — MIRTAZAPINE 7.5 MG/1
7.5 TABLET, FILM COATED ORAL NIGHTLY
COMMUNITY

## 2017-11-11 RX ORDER — DEXTROSE MONOHYDRATE 25 G/50ML
50 INJECTION, SOLUTION INTRAVENOUS AS NEEDED
Status: DISCONTINUED | OUTPATIENT
Start: 2017-11-11 | End: 2017-11-21

## 2017-11-11 RX ORDER — ACETAMINOPHEN 325 MG/1
650 TABLET ORAL EVERY 6 HOURS PRN
Status: DISCONTINUED | OUTPATIENT
Start: 2017-11-11 | End: 2017-11-15

## 2017-11-11 RX ORDER — SODIUM CHLORIDE 9 MG/ML
INJECTION, SOLUTION INTRAVENOUS
Status: DISPENSED
Start: 2017-11-11 | End: 2017-11-12

## 2017-11-11 RX ORDER — CALCIUM CARBONATE/VITAMIN D3 600 MG-10
1 TABLET ORAL DAILY
COMMUNITY

## 2017-11-11 RX ORDER — ACETAMINOPHEN 325 MG/1
650 TABLET ORAL EVERY 4 HOURS PRN
COMMUNITY

## 2017-11-11 RX ORDER — ONDANSETRON 2 MG/ML
4 INJECTION INTRAMUSCULAR; INTRAVENOUS EVERY 6 HOURS PRN
Status: DISCONTINUED | OUTPATIENT
Start: 2017-11-11 | End: 2017-11-21

## 2017-11-11 RX ORDER — POTASSIUM CHLORIDE 750 MG/1
20 TABLET, EXTENDED RELEASE ORAL DAILY
COMMUNITY

## 2017-11-11 RX ORDER — MAGNESIUM OXIDE 400 MG (241.3 MG MAGNESIUM) TABLET
3 TABLET NIGHTLY
COMMUNITY

## 2017-11-11 RX ORDER — SODIUM CHLORIDE 0.9 % (FLUSH) 0.9 %
3 SYRINGE (ML) INJECTION AS NEEDED
Status: DISCONTINUED | OUTPATIENT
Start: 2017-11-11 | End: 2017-11-21

## 2017-11-11 RX ORDER — ACETAMINOPHEN 325 MG/1
650 TABLET ORAL 3 TIMES DAILY
COMMUNITY
End: 2017-11-21

## 2017-11-11 RX ORDER — LOSARTAN POTASSIUM AND HYDROCHLOROTHIAZIDE 12.5; 1 MG/1; MG/1
1 TABLET ORAL DAILY
COMMUNITY
End: 2017-11-21

## 2017-11-11 RX ORDER — SODIUM CHLORIDE 9 MG/ML
125 INJECTION, SOLUTION INTRAVENOUS CONTINUOUS
Status: DISCONTINUED | OUTPATIENT
Start: 2017-11-11 | End: 2017-11-15

## 2017-11-11 RX ORDER — TIZANIDINE 4 MG/1
4 TABLET ORAL 2 TIMES DAILY
COMMUNITY
End: 2017-11-21

## 2017-11-11 RX ORDER — FOLIC ACID 1 MG/1
1 TABLET ORAL DAILY
Status: DISCONTINUED | OUTPATIENT
Start: 2017-11-11 | End: 2017-11-21

## 2017-11-11 RX ORDER — FUROSEMIDE 20 MG/1
20 TABLET ORAL EVERY OTHER DAY
COMMUNITY

## 2017-11-11 NOTE — ED PROVIDER NOTES
Patient Seen in: [facility] AND [facility] Emergency Department    History   Patient presents with:  Abnormal MRI    Stated Complaint: abnormal MRI    HPI    The patient is a 42-year-old female who was sent from her nursing home facility after having an MRI for above.    Physical Exam   ED Triage Vitals [11/11/17 1404]  BP: 107/54  Pulse: 93  Resp: 18  Temp: 98 °F (36.7 °C)  Temp src: Temporal  SpO2: 100 %  O2 Device: None (Room air)    Current:/57   Pulse 93   Temp 98 °F (36.7 °C) (Temporal)   Resp 18   Ht HCT 33.3 (*)     MCHC 31.5 (*)     RDW 18.8 (*)     MPV 7.0 (*)     All other components within normal limits   CBC WITH DIFFERENTIAL WITH PLATELET    Narrative: The following orders were created for panel order CBC WITH DIFFERENTIAL WITH PLATELET.

## 2017-11-11 NOTE — CM/SW NOTE
Spoke with [employer] PA- they are planning on ID to see patient and IV ABX as long as she is neurologically intact. Patient currently has C-diff per Gely VÁSQUEZ. Dr. Matti Orozco is MD on-call for Dr. Tori Peters.

## 2017-11-11 NOTE — ED INITIAL ASSESSMENT (HPI)
Pt presents to ED via superior after notified of an abnormal MRI result. Pt states she has been experiencing lower back pain \"for the past year\", and reports having RLE pain yesterday. Denies any recent injuries.  Pt is aox4 on arrival to ED, currently de

## 2017-11-11 NOTE — H&P
[facility] Hospitalist H&P       CC: Patient presents with:  Abnormal MRI     PCP: Rj Angulo    ASSESSMENT / PLAN:     Ms. Jd Kasper is a 79 yo F with PMH of breast cancer, CKD, arthritis, recent admission for E.coli urosepsis, and chronic back pain who lumpectomy-chemotherapy & radiation   • Macular degeneration    • Osteoarthrosis, unspecified whether generalized or localized, unspecified site     Left knee osteoarhtritis   • Other ill-defined conditions(799.89)     Hx if polyps      PSH  Past Surgical Results  Component Value Date   WBC 8.9 11/11/2017   HGB 10.5 11/11/2017   HCT 33.3 11/11/2017    11/11/2017   CREATSERUM 1.82 11/11/2017   BUN 39 11/11/2017    11/11/2017   K 4.8 11/11/2017    11/11/2017   CO2 21 11/11/2017    11

## 2017-11-11 NOTE — CONSULTS
[facility]    PATIENT'S NAME: Chen Domingo   ATTENDING PHYSICIAN: Phil Parmar DO   CONSULTING PHYSICIAN: Marixa Cash.  Attila Connor MD   PATIENT ACCOUNT#:   [id number]    LOCATION:  [address] [place] [address] #:   [id number]       DATE OF BIRTH: Clear.  ABDOMEN:  Nontender. No masses, rebound, or organomegaly. EXTREMITIES:  No clubbing, cyanosis, edema, or phlebitis. Straight leg raises are negative. LABORATORY DATA:  Two blood cultures have been sent. Procalcitonin sent. White count is 8.

## 2017-11-11 NOTE — CONSULTS
Marcel Cano is a 80year old female.    Patient presents with:  Abnormal MRI      HPI:    E.coli sepsis at [facility];c.diff too  Had burning pain left leg anterior, but whole leg involved  Abnormal mri    REVIEW OF SYSTEMS:   A comprehensive 11 point re lumpectomy-chemotherapy & radiation   • Macular degeneration    • Osteoarthrosis, unspecified whether generalized or localized, unspecified site     Left knee osteoarhtritis   • Other ill-defined conditions(259.89)     Hx if polyps      Past Surgical Histo performed during the hospital encounter of 20/82/52  -BASIC METABOLIC PANEL (8)   Result Value Ref Range   Glucose 152 (H) 70 - 99 mg/dL   Sodium 140 136 - 144 mmol/L   Potassium 4.8 3.3 - 5.1 mmol/L   Chloride 108 95 - 110 mmol/L   CO2 21 (L) 22 - 32 mmol

## 2017-11-12 PROCEDURE — 85025 COMPLETE CBC W/AUTO DIFF WBC: CPT | Performed by: HOSPITALIST

## 2017-11-12 PROCEDURE — 93005 ELECTROCARDIOGRAM TRACING: CPT

## 2017-11-12 PROCEDURE — 93010 ELECTROCARDIOGRAM REPORT: CPT | Performed by: HOSPITALIST

## 2017-11-12 PROCEDURE — 85610 PROTHROMBIN TIME: CPT | Performed by: HOSPITALIST

## 2017-11-12 PROCEDURE — 85007 BL SMEAR W/DIFF WBC COUNT: CPT | Performed by: HOSPITALIST

## 2017-11-12 PROCEDURE — 82962 GLUCOSE BLOOD TEST: CPT

## 2017-11-12 PROCEDURE — 85027 COMPLETE CBC AUTOMATED: CPT | Performed by: HOSPITALIST

## 2017-11-12 PROCEDURE — 80048 BASIC METABOLIC PNL TOTAL CA: CPT | Performed by: HOSPITALIST

## 2017-11-12 RX ORDER — SODIUM CHLORIDE 9 MG/ML
INJECTION, SOLUTION INTRAVENOUS
Status: DISPENSED
Start: 2017-11-12 | End: 2017-11-12

## 2017-11-12 RX ORDER — SODIUM CHLORIDE 9 MG/ML
INJECTION, SOLUTION INTRAVENOUS
Status: COMPLETED
Start: 2017-11-12 | End: 2017-11-12

## 2017-11-12 NOTE — CONSULTS
Patient: Luis Matos  Medical Record Number: [id number]  Site: [address] [place] [address]    HISTORY OF CHIEF COMPLAINT:    Luis Matso is a 80year old female, who has chronic h/o LBP with radiating left leg pain.  She was seen by Misha MEJIA in our offic Main Topics    Smoking status: Never Smoker                                                                Smokeless tobacco: Never Used                        Alcohol use: Yes           0.5 oz/week       Standard drinks or equivalent: 1 per week       Com in the distal cord or conus. The conus terminates at the L1 level. INTERVERTEBRAL DISCS: Abnormal fluid-filled L3-L4 disc consistent with discitis. There is severe loss of disc at the L4-L5 and L5-S1.  SOFT TISSUES: Paraspinal phlegmon seen arising from the stenosis; thecal sac measures 4.7 mm AP. Moderate severe bilateral neuroforaminal stenosis with foraminal extension of disc, worse on the LEFT than RIGHT.  Abnormal heterogeneous signal seen in the anterior L3 epidural space, compatible with an associated e compression fracture. 3. Additional moderate severe spondylitic changes as detailed above. 4. Stable partially imaged borderline proximal abdominal aortic aneurysm, unchanged from recent CT imaging.  Notification of important results: Findings were communic clubbing or cyanosis.  Calves supple, NT   ABDOMINAL EXAM: Abdomen is soft, NT.  MUSCULOSKELETAL: Fluid, pain-free ROM to bilateral: ankles, knees  & hips                                                                                     MEDICAL DECISION M

## 2017-11-12 NOTE — PROGRESS NOTES
[facility] Hospitalist Progress Note     CC: Hospital Follow up    PCP: Jah Staley       Assessment/Plan:     Principal Problem:    Acute exacerbation of chronic low back pain  Active Problems:    Epidural abscess    Ms. Rohini Monreal is a 81 yo F with PMH of b 146/72      Intake/Output:    Intake/Output Summary (Last 24 hours) at 11/12/17 1356  Last data filed at 11/12/17 0659   Gross per 24 hour   Intake          2177.08 ml   Output                2 ml   Net          2175.08 ml       Last 3 Weights  11/11/17 17 changes of the superior L4 endplate, compatible with osteomyelitis. NO posterior retropulsion. NO evidence of additional vertebral body compression fracture. 3. Additional moderate severe spondylitic changes as detailed above.  4. Stable partially imaged bennie

## 2017-11-12 NOTE — PROGRESS NOTES
Roro Beasley is a 80year old female. Patient presents with:  Abnormal MRI      HPI:    Up in chair; intermittent leg discomfort now behind knee per pt. REVIEW OF SYSTEMS:   A comprehensive 11 point review of systems was completed.   Pertinent pos eval.  2. Now on meropenem  3. Spoke with pt.             Lab Results  Component Value Date   WBC 7.7 11/12/2017   HGB 9.6 11/12/2017   HCT 30.0 11/12/2017    11/12/2017   CREATSERUM 1.30 11/12/2017   BUN 29 11/12/2017    11/12/2017   K 4.0 11/ Value Ref Range   POC Glucose  96 70 - 99   -POCT GLUCOSE   Result Value Ref Range   POC Glucose  112 (H) 70 - 99   -RAINBOW DRAW BLUE   Result Value Ref Range   Hold Blue Auto Resulted    -RAINBOW DRAW DARK GREEN   Result Value Ref Range   Hold Lt Green A

## 2017-11-13 ENCOUNTER — APPOINTMENT (OUTPATIENT)
Dept: INTERVENTIONAL RADIOLOGY/VASCULAR | Facility: HOSPITAL | Age: 82
DRG: 987 | End: 2017-11-13
Attending: INTERNAL MEDICINE
Payer: MEDICARE

## 2017-11-13 ENCOUNTER — APPOINTMENT (OUTPATIENT)
Dept: ULTRASOUND IMAGING | Facility: HOSPITAL | Age: 82
DRG: 987 | End: 2017-11-13
Attending: HOSPITALIST
Payer: MEDICARE

## 2017-11-13 PROCEDURE — 87070 CULTURE OTHR SPECIMN AEROBIC: CPT | Performed by: INTERNAL MEDICINE

## 2017-11-13 PROCEDURE — 76937 US GUIDE VASCULAR ACCESS: CPT

## 2017-11-13 PROCEDURE — 02HV33Z INSERTION OF INFUSION DEVICE INTO SUPERIOR VENA CAVA, PERCUTANEOUS APPROACH: ICD-10-PCS | Performed by: HOSPITALIST

## 2017-11-13 PROCEDURE — 87116 MYCOBACTERIA CULTURE: CPT | Performed by: INTERNAL MEDICINE

## 2017-11-13 PROCEDURE — 99152 MOD SED SAME PHYS/QHP 5/>YRS: CPT

## 2017-11-13 PROCEDURE — 85027 COMPLETE CBC AUTOMATED: CPT | Performed by: HOSPITALIST

## 2017-11-13 PROCEDURE — 97535 SELF CARE MNGMENT TRAINING: CPT

## 2017-11-13 PROCEDURE — 87206 SMEAR FLUORESCENT/ACID STAI: CPT | Performed by: INTERNAL MEDICINE

## 2017-11-13 PROCEDURE — 62267 INTERDISCAL PERQ ASPIR DX: CPT

## 2017-11-13 PROCEDURE — 87075 CULTR BACTERIA EXCEPT BLOOD: CPT | Performed by: INTERNAL MEDICINE

## 2017-11-13 PROCEDURE — 87205 SMEAR GRAM STAIN: CPT | Performed by: INTERNAL MEDICINE

## 2017-11-13 PROCEDURE — 99153 MOD SED SAME PHYS/QHP EA: CPT

## 2017-11-13 PROCEDURE — 80048 BASIC METABOLIC PNL TOTAL CA: CPT | Performed by: HOSPITALIST

## 2017-11-13 PROCEDURE — 82962 GLUCOSE BLOOD TEST: CPT

## 2017-11-13 PROCEDURE — 97162 PT EVAL MOD COMPLEX 30 MIN: CPT

## 2017-11-13 PROCEDURE — 97166 OT EVAL MOD COMPLEX 45 MIN: CPT

## 2017-11-13 PROCEDURE — 77003 FLUOROGUIDE FOR SPINE INJECT: CPT

## 2017-11-13 PROCEDURE — 36569 INSJ PICC 5 YR+ W/O IMAGING: CPT

## 2017-11-13 PROCEDURE — 0Q903ZX DRAINAGE OF LUMBAR VERTEBRA, PERCUTANEOUS APPROACH, DIAGNOSTIC: ICD-10-PCS | Performed by: RADIOLOGY

## 2017-11-13 RX ORDER — SODIUM CHLORIDE 0.9 % (FLUSH) 0.9 %
10 SYRINGE (ML) INJECTION AS NEEDED
Status: DISCONTINUED | OUTPATIENT
Start: 2017-11-13 | End: 2017-11-21

## 2017-11-13 RX ORDER — LIDOCAINE HYDROCHLORIDE 20 MG/ML
INJECTION, SOLUTION EPIDURAL; INFILTRATION; INTRACAUDAL; PERINEURAL
Status: COMPLETED
Start: 2017-11-13 | End: 2017-11-13

## 2017-11-13 RX ORDER — POTASSIUM CHLORIDE 20 MEQ/1
40 TABLET, EXTENDED RELEASE ORAL EVERY 4 HOURS
Status: COMPLETED | OUTPATIENT
Start: 2017-11-13 | End: 2017-11-14

## 2017-11-13 RX ORDER — MIDAZOLAM HYDROCHLORIDE 1 MG/ML
INJECTION INTRAMUSCULAR; INTRAVENOUS
Status: COMPLETED
Start: 2017-11-13 | End: 2017-11-13

## 2017-11-13 RX ORDER — SODIUM CHLORIDE 9 MG/ML
INJECTION, SOLUTION INTRAVENOUS
Status: DISPENSED
Start: 2017-11-13 | End: 2017-11-13

## 2017-11-13 RX ORDER — SODIUM CHLORIDE 9 MG/ML
INJECTION, SOLUTION INTRAVENOUS
Status: DISPENSED
Start: 2017-11-13 | End: 2017-11-14

## 2017-11-13 RX ORDER — LABETALOL HYDROCHLORIDE 5 MG/ML
INJECTION, SOLUTION INTRAVENOUS
Status: COMPLETED
Start: 2017-11-13 | End: 2017-11-13

## 2017-11-13 RX ORDER — LIDOCAINE HYDROCHLORIDE 10 MG/ML
0.5 INJECTION, SOLUTION INFILTRATION; PERINEURAL ONCE AS NEEDED
Status: ACTIVE | OUTPATIENT
Start: 2017-11-13 | End: 2017-11-13

## 2017-11-13 NOTE — PROCEDURES
[facility] HOSP - [facility]  Procedure Note    Jalen Woodward Patient Status:  Inpatient    1921 MRN [id number]   Location [facility] Attending Kaila Baker MD   Hosp Day # 2 PCP Tico Rodriguez     Procedure

## 2017-11-13 NOTE — PROGRESS NOTES
[facility] Hospitalist Progress Note     CC: Hospital Follow up    PCP: Edwardo Schaumann       Assessment/Plan:     Principal Problem:    Acute exacerbation of chronic low back pain  Active Problems:    Epidural abscess    Ms. Rigo De Los Santos is a 81 yo F with PMH of b occasional abdominal discomfort on and off over the past month. No post-menopausal vaginal bleeding. OBJECTIVE:    Blood pressure 127/58, pulse 116, temperature (!) 97 °F (36.1 °C), temperature source Oral, resp.  rate 18, height 149.9 cm (4' 11\"), henry TPROT      Imaging:  Mri Spine Lumbar (cpt=72148)    Result Date: 11/10/2017  IMPRESSION: 1. There is severe infectious L3-L4 spondylodiscitis, with extensive fluid-filled disc space and surrounding paraspinal phlegmon.  There is heterogeneous epidural absc Glucose-Vitamin C

## 2017-11-13 NOTE — CM/SW NOTE
DARNELL confirmed that pt came from [facility]. Pt is typically a resident at [facility]. Plan is for pt to return back to [facility] upon d/c. Pt to have PICC placed for LT IV Abx.      Sierra Avilez, [address]

## 2017-11-13 NOTE — PROGRESS NOTES
[facility] AND [facility] Infectious Disease  Progress Note    Marta Hernandez Patient Status:  Inpatient    1921 MRN [id number]   Location [facility] 5SW/SE Attending Carlos Sheldon MD   Hosp Day # 2 PCP Adarsh Garcia     Subjective September    Radiology:  1. There is severe infectious L3-L4 spondylodiscitis, with extensive fluid-filled disc space and  surrounding paraspinal phlegmon.  There is heterogeneous epidural abscess formation, posterior to the  L3 vertebral body, contributing complicated infection in this very active 79yo woman. Patient to go to IR today for fluid aspiration. Also of concern are the pelvic/uternie findings - if this is a nidus for infection it will need to be addressed as well. May need GYN evaluation.   Tee Banerjee

## 2017-11-13 NOTE — OCCUPATIONAL THERAPY NOTE
OCCUPATIONAL THERAPY EVALUATION - INPATIENT     Room Number: 525/525-A  Evaluation Date: 11/13/2017  Type of Evaluation: Initial  Presenting Problem:  (Acute exacerbation of low back pain; epidural abscess)    Physician Order: IP Consult to Occupational Th achieve PLOF. DISCHARGE RECOMMENDATIONS  OT Discharge Recommendations: 24 hour care/supervision;Sub-acute rehabilitation  OT Device Recommendations: TBD    PLAN  OT Treatment Plan: Balance activities; Energy conservation/work simplification techniques;AD Cognitive Status:  WFL - within functional limits    VISION  Current Vision: Patient's glasses on bedside table, but pt did not verbalize when she wears them.     PERCEPTION  Overall Perception Status:   WFL - within functional limits    SENSATION  No defic session  Upper Extremity Dressing: mod A  Lower Extremity Dressing: max A      Education Provided: role of IP OT, Plan of care   Patient End of Session: Up in chair;Needs met;Call light within reach;RN aware of session/findings; All patient questions and co

## 2017-11-13 NOTE — PROGRESS NOTES
Vascular Access Note    Vascular Access Screening:   Allergies to Lidocaine: no  Allergies to Latex: no  Presence of Pacemaker/Defibrillator: No  Mastectomy with Lymph Node Dissection: Right Side  AV Fistula / AV Graft: No  Dialysis Catheter: No  Central Kristy Walsh

## 2017-11-13 NOTE — PHYSICAL THERAPY NOTE
PHYSICAL THERAPY EVALUATION - INPATIENT     Room Number: 525/525-A  Evaluation Date: 11/13/2017  Type of Evaluation: Initial  Physician Order: PT Eval and Treat    Presenting Problem: LBP, radicular pain into LLE  Reason for Therapy: Mobility Dysfuncti pain  Active Problems:    Epidural abscess      Past Medical History  Past Medical History:   Diagnosis Date   • Arthritis     arthritis deformityy left knee   • Blind     Legally blind   • [facility]    lumpectomy-chemotherapy & radiation (including adjusting bedclothes, sheets and blankets)?: A Little   -   Sitting down on and standing up from a chair with arms (e.g., wheelchair, bedside commode, etc.): A Little   -   Moving from lying on back to sitting on the side of the bed?: A Little #6    Goal #6  Current Status

## 2017-11-13 NOTE — PROGRESS NOTES
S: Patient is lying in bed this morning, doing well. She was started on Meropenem per infectious disease after MRI showed osteomyelitis, discitis, and epidural abscess. She denies back or leg pain at this time.  Reports that pain increases in her legs when

## 2017-11-14 ENCOUNTER — APPOINTMENT (OUTPATIENT)
Dept: ULTRASOUND IMAGING | Facility: HOSPITAL | Age: 82
DRG: 987 | End: 2017-11-14
Attending: INTERNAL MEDICINE
Payer: MEDICARE

## 2017-11-14 PROCEDURE — 97116 GAIT TRAINING THERAPY: CPT

## 2017-11-14 PROCEDURE — 97530 THERAPEUTIC ACTIVITIES: CPT

## 2017-11-14 PROCEDURE — 36592 COLLECT BLOOD FROM PICC: CPT

## 2017-11-14 PROCEDURE — 85027 COMPLETE CBC AUTOMATED: CPT | Performed by: HOSPITALIST

## 2017-11-14 PROCEDURE — 76856 US EXAM PELVIC COMPLETE: CPT | Performed by: INTERNAL MEDICINE

## 2017-11-14 PROCEDURE — 84132 ASSAY OF SERUM POTASSIUM: CPT | Performed by: HOSPITALIST

## 2017-11-14 PROCEDURE — 80048 BASIC METABOLIC PNL TOTAL CA: CPT | Performed by: HOSPITALIST

## 2017-11-14 PROCEDURE — 82962 GLUCOSE BLOOD TEST: CPT

## 2017-11-14 RX ORDER — METOPROLOL SUCCINATE 25 MG/1
25 TABLET, EXTENDED RELEASE ORAL
Status: DISCONTINUED | OUTPATIENT
Start: 2017-11-14 | End: 2017-11-17

## 2017-11-14 RX ORDER — SODIUM CHLORIDE 9 MG/ML
INJECTION, SOLUTION INTRAVENOUS
Status: COMPLETED
Start: 2017-11-14 | End: 2017-11-14

## 2017-11-14 NOTE — DIETARY NOTE
ADULT NUTRITION INITIAL ASSESSMENT    Pt is at moderate nutrition risk. Pt meets malnutrition criteria. RECOMMENDATIONS TO MD:   CARMELLA educated pt on taking Oral Nutritional Supplements (ONS) to maximize po.  Also liberalized diet to General to allow inc pain. Epidural abscess, urosepsis. PERTINENT PAST MEDICAL HISTORY: Breast CA, DM, Legally blind, CKD, others as documented. ANTHROPOMETRICS:  HT: 149.9 cm (4' 11\")       WT: 41.1 kg (90 lb 8 oz)  BMI: Body mass index is 18.28 kg/m².          BMI Class able, PO and supplement greater than 75% of needs, labs WNL and prevent skin breakdown    DIETITIAN FOLLOW UP:  RD to follow up within 7 days.    Ginna Rdz, [address] [place] [address], [address] [place] [address]   Clinical Dietitian  [phone]

## 2017-11-14 NOTE — PROGRESS NOTES
[facility] AND [facility] Infectious Disease  Progress Note    Renae Villeda Patient Status:  Inpatient    1921 MRN [id number]   Location [facility] 5SW/SE Attending Willie Leigh MD   Hosp Day # 3 PCP Juan Turner     Subjective vertebral body, contributing to severe L3-L4 spinal canal stenosis, thecal sac measuring less  than 5 mm. NO drainable paraspinal fluid collection is detected on this noncontrast evaluation.   2. Chronic superior L4 compression fracture with suggestion of e AM

## 2017-11-14 NOTE — PROGRESS NOTES
[facility] Hospitalist Progress Note     CC: Hospital Follow up    PCP: Juan Alan       Assessment/Plan:     Principal Problem:    Acute exacerbation of chronic low back pain  Active Problems:    Epidural abscess    Ms. Massey Files is a 81 yo F with PMH of b new complaints. Denies CP, SOB, palpitations. OBJECTIVE:    Blood pressure 148/78, pulse 122, temperature (!) 97.5 °F (36.4 °C), temperature source Oral, resp. rate 20, height 149.9 cm (4' 11\"), weight 90 lb 8 oz (41.1 kg), SpO2 99 %.     Temp:  [97.3 Date: 11/13/2017  CONCLUSION:  1. Successful fluoroscopic guided L3/L4 intervertebral disc biopsy. Aspirated core specimen has been submitted to microbiology for analysis.            Meds:     • vancomycin  125 mg Oral BID   • folic acid  1 mg Oral Daily

## 2017-11-14 NOTE — PLAN OF CARE
Problem: Patient Centered Care  Goal: Patient preferences are identified and integrated in the patient's plan of care  Interventions:  - What would you like us to know as we care for you?  Keep me informed  - Provide timely, complete, and accurate informati for HR in 114-118s.

## 2017-11-14 NOTE — CONSULTS
[facility]    OB/GYNE Progress Note      Alvester Page Patient Status:  Inpatient    1921 MRN [id number]   Location [facility] 5SW/SE Attending Deette Sacks, MD   Hosp Day # 3 PCP Wilfrido Melara       Assessment/ Lab Results  Component Value Date   MG 1.8 12/20/2016   TROP 0.135 (HH) 09/17/2017    09/17/2017   CKMB 8.5 (H) 12/18/2016   B12 >2,000 (H) 09/18/2017   COLORUR Jaqueline (A) 09/16/2017   CLARITY Cloudy (A) 09/16/2017   SPECGRAVITY 1.019 09/16/20

## 2017-11-14 NOTE — CONSULTS
Reason for Consultation: Tachycardia    Assessment/Plan:     1. Acute exacerbation of chronic low back pain  2. Tachycardia  - nonsustained atrial tachycardia  3.  Pulmonary HTN  4. Epidural abscess      PLAN:  - restart home metoprolol      HPI:     Gala 1 g in sodium chloride 0.9 % 100 mL IVPB-minibag/addvantage 1 g Intravenous Q24H   Normal Saline Flush 0.9 % injection 10 mL 10 mL Intravenous PRN   0.9%  NaCl infusion 125 mL/hr Intravenous Continuous   vancomycin (FIRST-VANCOMYCIN 50) 50 MG/ML oral solut hours):      CONS: well developed, well nourished. HEAD/FACE:no trauma, normocephalic. EYES:conjunctiva not injected, no xanthelasma. ENT:mucosa pink and moist. NECK:jugular venous pressure not elevated.  RESP:normal rate and rhythm, clear to auscultat

## 2017-11-14 NOTE — PHYSICAL THERAPY NOTE
PHYSICAL THERAPY TREATMENT NOTE - INPATIENT    Room Number: 525/525-A       Presenting Problem: LBP, radicular pain into LLE    Problem List  Principal Problem:    Acute exacerbation of chronic low back pain  Active Problems:    Epidural abscess      ASSE LE pain   Management Techniques: Activity promotion; Body mechanics;Repositioning    BALANCE                                                                                                                     Static Sitting: Good  Dynamic Sitting: Fair + session/findings; All patient questions and concerns addressed; Alarm set    CURRENT GOALS   CURRENT GOALS     Goals to be met by: 11/25/17  Patient Goal Patient's self-stated goal is: to go home    Goal #1 Patient is able to demonstrate supine - sit EOB @ l

## 2017-11-15 ENCOUNTER — APPOINTMENT (OUTPATIENT)
Dept: GENERAL RADIOLOGY | Facility: HOSPITAL | Age: 82
DRG: 987 | End: 2017-11-15
Attending: HOSPITALIST
Payer: MEDICARE

## 2017-11-15 ENCOUNTER — APPOINTMENT (OUTPATIENT)
Dept: ULTRASOUND IMAGING | Facility: HOSPITAL | Age: 82
DRG: 987 | End: 2017-11-15
Attending: HOSPITALIST
Payer: MEDICARE

## 2017-11-15 ENCOUNTER — APPOINTMENT (OUTPATIENT)
Dept: CV DIAGNOSTICS | Facility: HOSPITAL | Age: 82
DRG: 987 | End: 2017-11-15
Attending: HOSPITALIST
Payer: MEDICARE

## 2017-11-15 ENCOUNTER — APPOINTMENT (OUTPATIENT)
Dept: NUCLEAR MEDICINE | Facility: HOSPITAL | Age: 82
DRG: 987 | End: 2017-11-15
Attending: HOSPITALIST
Payer: MEDICARE

## 2017-11-15 PROCEDURE — 71020 XR CHEST PA + LAT CHEST (CPT=71020): CPT | Performed by: HOSPITALIST

## 2017-11-15 PROCEDURE — 78582 LUNG VENTILAT&PERFUS IMAGING: CPT | Performed by: HOSPITALIST

## 2017-11-15 PROCEDURE — 71010 XR CHEST AP PORTABLE  (CPT=71010): CPT | Performed by: HOSPITALIST

## 2017-11-15 PROCEDURE — 80048 BASIC METABOLIC PNL TOTAL CA: CPT | Performed by: HOSPITALIST

## 2017-11-15 PROCEDURE — 82962 GLUCOSE BLOOD TEST: CPT

## 2017-11-15 PROCEDURE — 85730 THROMBOPLASTIN TIME PARTIAL: CPT | Performed by: HOSPITALIST

## 2017-11-15 PROCEDURE — 82805 BLOOD GASES W/O2 SATURATION: CPT | Performed by: HOSPITALIST

## 2017-11-15 PROCEDURE — 85027 COMPLETE CBC AUTOMATED: CPT | Performed by: HOSPITALIST

## 2017-11-15 PROCEDURE — 36592 COLLECT BLOOD FROM PICC: CPT

## 2017-11-15 PROCEDURE — 93970 EXTREMITY STUDY: CPT | Performed by: HOSPITALIST

## 2017-11-15 RX ORDER — HEPARIN SODIUM AND DEXTROSE 10000; 5 [USP'U]/100ML; G/100ML
INJECTION INTRAVENOUS CONTINUOUS
Status: DISCONTINUED | OUTPATIENT
Start: 2017-11-15 | End: 2017-11-19

## 2017-11-15 RX ORDER — ACETAMINOPHEN 325 MG/1
650 TABLET ORAL EVERY 6 HOURS PRN
Status: DISCONTINUED | OUTPATIENT
Start: 2017-11-15 | End: 2017-11-21

## 2017-11-15 RX ORDER — POTASSIUM CHLORIDE 20 MEQ/1
40 TABLET, EXTENDED RELEASE ORAL EVERY 4 HOURS
Status: COMPLETED | OUTPATIENT
Start: 2017-11-15 | End: 2017-11-15

## 2017-11-15 RX ORDER — SODIUM CHLORIDE 9 MG/ML
INJECTION, SOLUTION INTRAVENOUS
Status: COMPLETED
Start: 2017-11-15 | End: 2017-11-15

## 2017-11-15 RX ORDER — 0.9 % SODIUM CHLORIDE 0.9 %
VIAL (ML) INJECTION
Status: COMPLETED
Start: 2017-11-15 | End: 2017-11-15

## 2017-11-15 RX ORDER — HEPARIN SODIUM AND DEXTROSE 10000; 5 [USP'U]/100ML; G/100ML
18 INJECTION INTRAVENOUS ONCE
Status: COMPLETED | OUTPATIENT
Start: 2017-11-15 | End: 2017-11-15

## 2017-11-15 RX ORDER — HEPARIN SODIUM 1000 [USP'U]/ML
80 INJECTION, SOLUTION INTRAVENOUS; SUBCUTANEOUS ONCE
Status: COMPLETED | OUTPATIENT
Start: 2017-11-15 | End: 2017-11-15

## 2017-11-15 NOTE — PROGRESS NOTES
PICC reposition  Called by patient's RN Barbara Hays to assess a malpositioned PICC. CXR result read PICC kinked near the aortic arch and tip pointing cephalad. ABG done and confirmed venous blood sample and venous placement of catheter.   Catheter pulled about

## 2017-11-15 NOTE — PHYSICAL THERAPY NOTE
Attempted to see patient for physical therapy session. Patient declined participation stating she felt too weak. Will attempt to see patient tomorrow for physical therapy session as time permits. RN aware.

## 2017-11-15 NOTE — PROGRESS NOTES
S: Patient was seen with Dr. Zhong Rolling today at bedside. Patient describes pain into lower extremities that \"moves around. \" Today she notes the pain is worse in her left thigh and knee. The pain is worse when standing and walking, better with lying down.  She Samantha Roth PA-C MD addendum:  I have seen and examined the patient independently and agree with the findings above. I discussed with the patient the diagnosis and treatment plan. Patient agrees with this plan and will follow up as indicated.

## 2017-11-15 NOTE — PROGRESS NOTES
[facility] Infectious Disease  Progress Note    Isai Durham Patient Status:  Inpatient    1921 MRN [id number]   Location [facility] 5SW/SE Attending Rush Livingston MD   Hosp Day # 4 PCP Ericka Wu     Subjective:  Ginie sac measuring less  than 5 mm. NO drainable paraspinal fluid collection is detected on this noncontrast evaluation.   2. Chronic superior L4 compression fracture with suggestion of early osteolytic changes of the  superior L4 endplate, compatible with osteo

## 2017-11-15 NOTE — PROGRESS NOTES
Assessment and Plan:     1. Acute exacerbation of chronic low back pain  2. Tachycardia  - non-sustained atrial tach  3.  Pulmonary HTN  4. Epidural abscess      PLAN:  - continue low dose metoprolol      Subjective:     No c/o    Objective:   Temp:  [97. fluoroscopic guided L3/L4 intervertebral disc biopsy. Aspirated core specimen has been submitted to microbiology for analysis.

## 2017-11-15 NOTE — PROGRESS NOTES
[facility] Hospitalist Progress Note     CC: Hospital Follow up    PCP: Apolonia Rushing       Assessment/Plan:     Principal Problem:    Acute exacerbation of chronic low back pain  Active Problems:    Epidural abscess    Ms. Karolina Solorio is a 81 yo F with PMH of b continue to follow patient while in house     Patient and/or patient's family given opportunity to ask questions and note understanding and agreeing with therapeutic plan as outlined     Natalya Abdi MD  [facility] Hospitalist  Answering Service number: [phone] 7.0*   NA  142  139  136   K  3.6  4.5  4.5  3.6   CL  118*  116*  111*   CO2  14*  19*  19*       No results for input(s): ALT, AST, ALB, AMYLASE, LIPASE, LDH in the last 72 hours.     Invalid input(s): ALPHOS, TBIL, DBIL, TPROT      Imaging:  Us Venous Do

## 2017-11-16 ENCOUNTER — APPOINTMENT (OUTPATIENT)
Dept: CV DIAGNOSTICS | Facility: HOSPITAL | Age: 82
DRG: 987 | End: 2017-11-16
Attending: HOSPITALIST
Payer: MEDICARE

## 2017-11-16 ENCOUNTER — APPOINTMENT (OUTPATIENT)
Dept: GENERAL RADIOLOGY | Facility: HOSPITAL | Age: 82
DRG: 987 | End: 2017-11-16
Attending: HOSPITALIST
Payer: MEDICARE

## 2017-11-16 PROCEDURE — 71010 XR CHEST AP PORTABLE  (CPT=71010): CPT | Performed by: HOSPITALIST

## 2017-11-16 PROCEDURE — 93306 TTE W/DOPPLER COMPLETE: CPT | Performed by: HOSPITALIST

## 2017-11-16 PROCEDURE — 80048 BASIC METABOLIC PNL TOTAL CA: CPT | Performed by: HOSPITALIST

## 2017-11-16 PROCEDURE — 85730 THROMBOPLASTIN TIME PARTIAL: CPT | Performed by: HOSPITALIST

## 2017-11-16 PROCEDURE — 97116 GAIT TRAINING THERAPY: CPT

## 2017-11-16 PROCEDURE — 02PYX3Z REMOVAL OF INFUSION DEVICE FROM GREAT VESSEL, EXTERNAL APPROACH: ICD-10-PCS | Performed by: HOSPITALIST

## 2017-11-16 PROCEDURE — 85007 BL SMEAR W/DIFF WBC COUNT: CPT | Performed by: HOSPITALIST

## 2017-11-16 PROCEDURE — 82962 GLUCOSE BLOOD TEST: CPT

## 2017-11-16 PROCEDURE — 80076 HEPATIC FUNCTION PANEL: CPT | Performed by: HOSPITALIST

## 2017-11-16 PROCEDURE — 85027 COMPLETE CBC AUTOMATED: CPT | Performed by: HOSPITALIST

## 2017-11-16 PROCEDURE — 85025 COMPLETE CBC W/AUTO DIFF WBC: CPT | Performed by: HOSPITALIST

## 2017-11-16 PROCEDURE — 05H433Z INSERTION OF INFUSION DEVICE INTO LEFT INNOMINATE VEIN, PERCUTANEOUS APPROACH: ICD-10-PCS | Performed by: HOSPITALIST

## 2017-11-16 PROCEDURE — 36584 COMPL RPLCMT PICC RS&I: CPT

## 2017-11-16 PROCEDURE — 83735 ASSAY OF MAGNESIUM: CPT | Performed by: HOSPITALIST

## 2017-11-16 RX ORDER — MAGNESIUM SULFATE HEPTAHYDRATE 40 MG/ML
2 INJECTION, SOLUTION INTRAVENOUS ONCE
Status: COMPLETED | OUTPATIENT
Start: 2017-11-16 | End: 2017-11-17

## 2017-11-16 RX ORDER — WARFARIN SODIUM 5 MG/1
5 TABLET ORAL NIGHTLY
Status: DISCONTINUED | OUTPATIENT
Start: 2017-11-16 | End: 2017-11-19

## 2017-11-16 RX ORDER — FUROSEMIDE 20 MG/1
20 TABLET ORAL DAILY
Status: COMPLETED | OUTPATIENT
Start: 2017-11-16 | End: 2017-11-17

## 2017-11-16 NOTE — PHYSICAL THERAPY NOTE
PHYSICAL THERAPY TREATMENT NOTE - INPATIENT    Room Number: 525/525-A       Presenting Problem: LBP, radicular pain into LLE    Problem List  Principal Problem:    Acute exacerbation of chronic low back pain  Active Problems:    Epidural abscess      ASSE TOLERANCE  O2 Saturation: 100%  Room air    AM-PAC '6-Clicks' INPATIENT SHORT FORM - BASIC MOBILITY  How much difficulty does the patient currently have. ..  -   Turning over in bed (including adjusting bedclothes, sheets and blankets)?: A Little   -   Sitt discharge.    Goal #5   Current Status  education as above  Ankle pumps    Goal #6     Goal #6  Current Status

## 2017-11-16 NOTE — PROGRESS NOTES
Assessment and Plan:     1. Acute exacerbation of chronic low back pain due to epidural abscess  2. Tachycardia  - non-sustained atrial tach  3. Pulmonary HTN, noted on prior echo  4. MR, moderate on echo Sept 2017  5. DVT, right leg, on IV heparin  6.  H left periaortic region of the aortic arch. Correlate for possible arterial placement of the PICC. Recommend adjustment in position. 2.  Small bibasilar pleural effusions. 3.  Small bibasilar pulmonary densities which could represent atelectasis.  [place]

## 2017-11-16 NOTE — PLAN OF CARE
HEMATOLOGIC - ADULT    • Free from bleeding injury Progressing        Patient Centered Care    • Patient preferences are identified and integrated in the patient's plan of care Progressing        Patient/Family Goals    • Patient/Family Long Term Goal Prog

## 2017-11-16 NOTE — PROGRESS NOTES
Vascular Access Note inserted by Chacha Viera RN    Vascular Access Screening:   Allergies to Lidocaine: no  Allergies to Latex: no  Presence of Pacemaker/Defibrillator: No  Mastectomy with Lymph Node Dissection: Right Side  AV Fistula / AV Graft: No  Dial

## 2017-11-16 NOTE — PROGRESS NOTES
[facility] Hospitalist Progress Note     CC: Hospital Follow up    PCP: Gonzalo Howard       Assessment/Plan:     Principal Problem:    Acute exacerbation of chronic low back pain  Active Problems:    Epidural abscess    Ms. Merrill Long is a 79 yo F with PMH of b stable  - monitor  - slighly volume overloaded on CXR, low dose lasix for 2 days     HTN  - BB, ARB     DM2  - not on home meds     HL  - statin     FN:  - IVF: none  - Diet: DM     DVT Prophy: SCD, heparin drip started, warfarin started  Lines: HENRY Watts Labs:     Recent Labs   Lab  11/14/17   0450  11/15/17   0530  11/16/17   0838   RBC  3.05*  3.07*  3.23*   HGB  9.0*  8.8*  9.2*   HCT  27.1*  27.0*  28.9*   MCV  88.8  87.9  89.7   MCH  29.3  28.8  28.7   MCHC  33.1  32.7  31.9*   RDW  18.5*  18.7*  18 (cpt=71010)    Result Date: 11/15/2017  CONCLUSION:  1. Left PICC line tip in left innominate vein. 2. Mild CHF with cardiomegaly, pulmonary venous congestion, and small bibasilar pleural effusions.  3. Atelectasis with or without pneumonia in the left lowe

## 2017-11-16 NOTE — PLAN OF CARE
Problem: Patient Centered Care  Goal: Patient preferences are identified and integrated in the patient's plan of care  Interventions:  - What would you like us to know as we care for you?  Keep me informed  - Provide timely, complete, and accurate informati mobilization of patient  - Hold pressure on venipuncture sites to achieve adequate hemostasis  - Assess for signs and symptoms of internal bleeding  - Monitor lab trends  Patient on Heparin drip educated on possible bleeding will continue to monitor

## 2017-11-16 NOTE — PROGRESS NOTES
[facility] AND [facility] Infectious Disease Progress Note    Roro Beasley Patient Status:  Inpatient    1921 MRN [id number]   Location [facility] 5SW/SE Attending Mary Lou Anaya MD   Hosp Day # 5 PCP Kolby Null     Subjective:  Pt den °C), Min:97.2 °F (36.2 °C), Max:97.6 °F (36.4 °C)      HEENT: Exam is unremarkable. Without scleral icterus. Mucous membranes are moist. PERRLA. Oropharynx is clear. Neck: No tenderness to palpitation.   Full range of motion to flexion and extension, la cysts, unlikely infectious in etiology  6. Malpositioned PICC  -plans for catheter exchange  7. Ceftin allergy  -likely GI, and not true allergy  -tolerating IV ceftriaxone  8.   Dispo  -continue IV abx for 8-12 weeks, at least until 1/8/18    If you have

## 2017-11-17 PROCEDURE — 84484 ASSAY OF TROPONIN QUANT: CPT | Performed by: HOSPITALIST

## 2017-11-17 PROCEDURE — 85007 BL SMEAR W/DIFF WBC COUNT: CPT | Performed by: HOSPITALIST

## 2017-11-17 PROCEDURE — 85027 COMPLETE CBC AUTOMATED: CPT | Performed by: HOSPITALIST

## 2017-11-17 PROCEDURE — 83880 ASSAY OF NATRIURETIC PEPTIDE: CPT | Performed by: HOSPITALIST

## 2017-11-17 PROCEDURE — 84132 ASSAY OF SERUM POTASSIUM: CPT | Performed by: HOSPITALIST

## 2017-11-17 PROCEDURE — 82962 GLUCOSE BLOOD TEST: CPT

## 2017-11-17 PROCEDURE — 80048 BASIC METABOLIC PNL TOTAL CA: CPT | Performed by: HOSPITALIST

## 2017-11-17 PROCEDURE — 80061 LIPID PANEL: CPT | Performed by: HOSPITALIST

## 2017-11-17 PROCEDURE — 85025 COMPLETE CBC W/AUTO DIFF WBC: CPT | Performed by: HOSPITALIST

## 2017-11-17 PROCEDURE — 93005 ELECTROCARDIOGRAM TRACING: CPT

## 2017-11-17 PROCEDURE — 93010 ELECTROCARDIOGRAM REPORT: CPT | Performed by: HOSPITALIST

## 2017-11-17 PROCEDURE — 83735 ASSAY OF MAGNESIUM: CPT | Performed by: HOSPITALIST

## 2017-11-17 PROCEDURE — 85610 PROTHROMBIN TIME: CPT | Performed by: HOSPITALIST

## 2017-11-17 PROCEDURE — 85730 THROMBOPLASTIN TIME PARTIAL: CPT | Performed by: HOSPITALIST

## 2017-11-17 RX ORDER — METOPROLOL SUCCINATE 50 MG/1
50 TABLET, EXTENDED RELEASE ORAL
Status: DISCONTINUED | OUTPATIENT
Start: 2017-11-18 | End: 2017-11-18

## 2017-11-17 RX ORDER — POTASSIUM CHLORIDE 20 MEQ/1
40 TABLET, EXTENDED RELEASE ORAL EVERY 4 HOURS
Status: COMPLETED | OUTPATIENT
Start: 2017-11-17 | End: 2017-11-17

## 2017-11-17 RX ORDER — ASPIRIN 81 MG/1
81 TABLET ORAL DAILY
Status: DISCONTINUED | OUTPATIENT
Start: 2017-11-17 | End: 2017-11-18

## 2017-11-17 RX ORDER — FUROSEMIDE 10 MG/ML
20 INJECTION INTRAMUSCULAR; INTRAVENOUS ONCE
Status: COMPLETED | OUTPATIENT
Start: 2017-11-17 | End: 2017-11-17

## 2017-11-17 RX ORDER — METOPROLOL SUCCINATE 25 MG/1
25 TABLET, EXTENDED RELEASE ORAL ONCE
Status: COMPLETED | OUTPATIENT
Start: 2017-11-17 | End: 2017-11-17

## 2017-11-17 RX ORDER — FUROSEMIDE 10 MG/ML
20 INJECTION INTRAMUSCULAR; INTRAVENOUS DAILY
Status: DISCONTINUED | OUTPATIENT
Start: 2017-11-18 | End: 2017-11-17

## 2017-11-17 RX ORDER — FUROSEMIDE 10 MG/ML
20 INJECTION INTRAMUSCULAR; INTRAVENOUS
Status: DISCONTINUED | OUTPATIENT
Start: 2017-11-17 | End: 2017-11-19

## 2017-11-17 NOTE — PHYSICAL THERAPY NOTE
Attempted to see patient for physical therapy session. Per RN patient with increased heart rate and had stat EKG. Will attempt to see patient later in the day as time permits. RN aware.

## 2017-11-17 NOTE — PROGRESS NOTES
[facility] Hospitalist Progress Note     CC: Hospital Follow up    PCP: Elyssa Moore       Assessment/Plan:     Principal Problem:    Acute exacerbation of chronic low back pain  Active Problems:    Epidural abscess    Ms. Tammy Pritchett is a 81 yo F with PMH of b picc team    Distension of uterine cavity  - seen on MRI pelvis; heterogenous fluid, likely hematometra-pyometra  - no hx of post-menopausal bleeding, does not know when last pap smear was  - US pelvis with moderate endometrial fluid, GYN did not think Kendrick Bar kg)  11/11/17 1716 : 90 lb 8 oz (41.1 kg)  11/11/17 1404 : 100 lb (45.4 kg)  11/08/17 1256 : 95 lb (43.1 kg)  11/08/17 1258 : 95 lb (43.1 kg)      Exam   GEN: elderly female in NAD  HEENT: EOMI, PERRLA  Neck: Supple, no JVD  Pulm: CTAB, no crackles or whee (vwv=[id number])    Result Date: 11/15/2017  CONCLUSION:  1. Thrombosed deep vein in the right calf as described above. 2. No additional evidence of DVT in the right or left lower extremity.  3. Fluid collections in the right and left popliteal fossa as describe

## 2017-11-17 NOTE — PROGRESS NOTES
Dr. Jelena Saeed notified per tele patient's -130's sustained. /86, pulse ox 98%RA. Patient asymptomatic. C/o left leg pain, Tylenol given po. Order received for stat EKG, EKG tech notified.

## 2017-11-17 NOTE — PROGRESS NOTES
Dr. Priti Flores notified per tele tech patient had 6 beats of vtach hr 150 with psvt 3 sec, then decreased to st 101 with freq pac.  bp 115/61, pulse ox 98%. Patient asymptomatic. Order received for metropolol 25 mgm. Dr. Priti Flores also aware troponin level 0.07.

## 2017-11-17 NOTE — PROGRESS NOTES
Assessment and Plan:     1. Acute exacerbation of chronic low back pain due to epidural abscess  2. Tachycardia  - non-sustained atrial tach; also some sinus tach, possibly related to fluid overload  3.  Pulmonary HTN, now severe  4. MR, moderate-severe Labs   Lab  11/17/17   0600   INR  1.2       Xr Chest Pa + Lat Chest (iwg=[id number])    Result Date: 11/15/2017  CONCLUSION:  1. There is a left upper chest the PICC.   The catheter courses over the mediastinum and then extends cephalad over the left periaorti identified, this possibility cannot be completely     excluded on the basis of this study. 2. Aortic valve: Mild regurgitation. Mean gradient: 4mm Hg (S). Valve area:     2.02cm^2(VTI). 3. Mitral valve: Moderate to severe regurgitation.   4. Left atrium:

## 2017-11-17 NOTE — OCCUPATIONAL THERAPY NOTE
Attempted to see patient at 09:10 this morning. RN stated patient had an increased HR and EKG done and asked to hold on therapy for this morning. Will follow-up with plan of care as schedule allows.

## 2017-11-17 NOTE — PROGRESS NOTES
Patient transferred to  per md order, room 338. Report given to Ace Kin in agreement with transfer. Nephew, EDX,[phone], in agreement with transfer, patient in agreement with transfer. Heparin drip sign off done with Layla Bobo.   Tele

## 2017-11-17 NOTE — PROGRESS NOTES
Rentae Benson is a 80year old female. Patient presents with:  Abnormal MRI      HPI:    Per pt stools still loose but better on po vanc    REVIEW OF SYSTEMS:   A comprehensive 11 point review of systems was completed.   Pertinent positives and negati L3-L4 discitis and phlegmon and epidural abscess  -s/p IR drainage and biopsy  -seen by ortho spine  -cultures NG  -on IV ceftriaxone  2.   Recent gram negative sepsis  -cultures with e coli  - source, also with e coli in urine  -s/p IV meropenem in hospi Calculated Osmolality 302 (H) 275 - 295 mOsm/kg   GFR, Non-African American 26 (L) >=60   GFR, -American 31 (L) >=60   -PROCALCITONIN   Result Value Ref Range   Procalcitonin 0.97 (H) <=0.11 ng/mL   -HEMOGLOBIN A1C   Result Value Ref Range   Glyco MCHC 33.1 32.0 - 37.0 g/dl   RDW 18.5 (H) 11.0 - 15.0 %    140 - 400 K/UL   MPV 6.3 (L) 7.4 - 10.3 fL   -BASIC METABOLIC PANEL (8)   Result Value Ref Range   Glucose 94 70 - 99 mg/dL   Sodium 139 136 - 144 mmol/L   Potassium 4.5 3.3 - 5.1 mmol/L P-50 26 24 - 28 mmHg   Oxygen Delivery Device Room Air    Modified Allens Test Not Applicable    Puncture Charge No    Blood Gas Analyzer ABLA    -PTT, ACTIVATED   Result Value Ref Range   .8 (H) 23.2 - 35.3 seconds   -PTT, ACTIVATED   Result Value -PTT, ACTIVATED   Result Value Ref Range   PTT 82.5 (H) 23.2 - 35.3 seconds   -TROPONIN I   Result Value Ref Range   Troponin 0.07 (HH) <=0.03 ng/mL   -LIPID PANEL   Result Value Ref Range   HDL Cholesterol 37 mg/dL   Cholesterol, Total 154 110 - 200 mg/ DARK GREEN   Result Value Ref Range   Hold Lt Green Auto Resulted    -RAINBOW DRAW GOLD   Result Value Ref Range   Hold Gold Auto Resulted    -RAINBOW DRAW LAVENDER TALL (BNP)   Result Value Ref Range   Hold Lavender Auto Resulted    -BLOOD CULTURE   Resul Eosinophil Absolute 0.1 0.0 - 0.7 K/UL   Basophil Absolute 0.0 0.0 - 0.2 K/UL   -CBC W/ DIFFERENTIAL   Result Value Ref Range   WBC 8.8 4.0 - 11.0 K/UL   RBC 3.23 (L) 3.70 - 5.40 M/UL   HGB 9.2 (L) 12.0 - 16.0 g/dL   HCT 28.9 (L) 35.0 - 48.0 %   MCV 89. 7

## 2017-11-18 PROCEDURE — 82962 GLUCOSE BLOOD TEST: CPT

## 2017-11-18 PROCEDURE — 85025 COMPLETE CBC W/AUTO DIFF WBC: CPT | Performed by: HOSPITALIST

## 2017-11-18 PROCEDURE — 85730 THROMBOPLASTIN TIME PARTIAL: CPT | Performed by: HOSPITALIST

## 2017-11-18 PROCEDURE — 85610 PROTHROMBIN TIME: CPT | Performed by: HOSPITALIST

## 2017-11-18 PROCEDURE — 83735 ASSAY OF MAGNESIUM: CPT | Performed by: HOSPITALIST

## 2017-11-18 PROCEDURE — 85007 BL SMEAR W/DIFF WBC COUNT: CPT | Performed by: HOSPITALIST

## 2017-11-18 PROCEDURE — 80048 BASIC METABOLIC PNL TOTAL CA: CPT | Performed by: HOSPITALIST

## 2017-11-18 PROCEDURE — 85027 COMPLETE CBC AUTOMATED: CPT | Performed by: HOSPITALIST

## 2017-11-18 RX ORDER — METOPROLOL SUCCINATE 25 MG/1
25 TABLET, EXTENDED RELEASE ORAL ONCE
Status: COMPLETED | OUTPATIENT
Start: 2017-11-18 | End: 2017-11-18

## 2017-11-18 NOTE — PROGRESS NOTES
[facility] AND [facility] Infectious Disease Progress Note    Faith Herbert Patient Status:  Inpatient    1921 MRN [id number]   Location [facility] 5SW/SE Attending Aman Stanford MD   Hosp Day # 7 KALEB Tanner Monday     Subjective:  Pt wit distress. Vital Signs:  Blood pressure 136/97, pulse 104, temperature 97.7 °F (36.5 °C), temperature source Oral, resp. rate 17, height 4' 11\" (1.499 m), weight 94 lb 11.2 oz (43 kg), SpO2 94 %.    Temp (24hrs), Av.4 °F (36.3 °C), Min:97.2 °F (36.2 °C uterus with fluid  -s/p pelvic US with hydrometra  -seen by GYN  5. DVT  -US with R calf DVT  -fluid collection also noted of R and L popliteal fossa, possibly backer cysts, unlikely infectious in etiology  6.   Ceftin allergy  -likely GI, and not true all

## 2017-11-18 NOTE — PROGRESS NOTES
Assessment and Plan:     1. Acute exacerbation of chronic low back pain  - epidural abscess  2. Tachycardia  - non-sustained atrial tach  3. Acute on chronic systolic and diastolic heart failure  - EF 35-40% 11/17  - EF 55-60% 9/17  4.  Pulmonary HTN--sev frequent PAC s . Voltage criteria for LVH met.  -Nonspecific ST depression + Negative precordial T-waves -Seen with left ventricular hypertrophy (strain)-Possible Anteroseptal ischemia. Consider lead placement.  ABNORMAL When compared with ECG of 11/12/2017

## 2017-11-18 NOTE — PROGRESS NOTES
[facility] Hospitalist Progress Note     CC: Hospital Follow up    PCP: Sarah López       Assessment/Plan:     Principal Problem:    Acute exacerbation of chronic low back pain  Active Problems:    Epidural abscess    Ms. Jimmie Lozano is a 79 yo F with PMH of b antibiotics  - picc placed, initially not in correct area, since replaced by picc team    Distension of uterine cavity  - seen on MRI pelvis; heterogenous fluid, likely hematometra-pyometra  - no hx of post-menopausal bleeding, does not know when last pap Weights  11/18/17 0400 : 94 lb 11.2 oz (43 kg)  11/17/17 0604 : 99 lb 3.2 oz (45 kg)  11/16/17 0700 : 98 lb (44.5 kg)  11/11/17 1716 : 90 lb 8 oz (41.1 kg)  11/11/17 1404 : 100 lb (45.4 kg)  11/08/17 1256 : 95 lb (43.1 kg)  11/08/17 1258 : 95 lb (43.1 kg) representing pulmonary artery enlargement. Findings discussed with Dr. Sofía Logan on 11/15/17 at 1640 hours. Us Venous Doppler Leg Bilat - Diag Img (cpt=93970)    Result Date: 11/15/2017  CONCLUSION:  1.  Thrombosed deep vein in the right calf as described

## 2017-11-18 NOTE — PLAN OF CARE
HEMATOLOGIC - ADULT    • Free from bleeding injury Progressing    Heparin gtt     Patient Centered Care    • Patient preferences are identified and integrated in the patient's plan of care Progressing    Client participates in care     Patient/Family Goals

## 2017-11-19 ENCOUNTER — APPOINTMENT (OUTPATIENT)
Dept: GENERAL RADIOLOGY | Facility: HOSPITAL | Age: 82
DRG: 987 | End: 2017-11-19
Attending: HOSPITALIST
Payer: MEDICARE

## 2017-11-19 PROCEDURE — 83735 ASSAY OF MAGNESIUM: CPT | Performed by: HOSPITALIST

## 2017-11-19 PROCEDURE — 85730 THROMBOPLASTIN TIME PARTIAL: CPT | Performed by: HOSPITALIST

## 2017-11-19 PROCEDURE — 73562 X-RAY EXAM OF KNEE 3: CPT | Performed by: HOSPITALIST

## 2017-11-19 PROCEDURE — 82962 GLUCOSE BLOOD TEST: CPT

## 2017-11-19 PROCEDURE — 84132 ASSAY OF SERUM POTASSIUM: CPT | Performed by: HOSPITALIST

## 2017-11-19 PROCEDURE — 97535 SELF CARE MNGMENT TRAINING: CPT

## 2017-11-19 PROCEDURE — 85025 COMPLETE CBC W/AUTO DIFF WBC: CPT | Performed by: HOSPITALIST

## 2017-11-19 PROCEDURE — 80048 BASIC METABOLIC PNL TOTAL CA: CPT | Performed by: HOSPITALIST

## 2017-11-19 PROCEDURE — 85610 PROTHROMBIN TIME: CPT | Performed by: HOSPITALIST

## 2017-11-19 RX ORDER — METOPROLOL SUCCINATE 25 MG/1
25 TABLET, EXTENDED RELEASE ORAL ONCE
Status: COMPLETED | OUTPATIENT
Start: 2017-11-19 | End: 2017-11-19

## 2017-11-19 RX ORDER — METOPROLOL SUCCINATE 100 MG/1
100 TABLET, EXTENDED RELEASE ORAL
Status: DISCONTINUED | OUTPATIENT
Start: 2017-11-20 | End: 2017-11-21

## 2017-11-19 RX ORDER — POTASSIUM CHLORIDE 29.8 MG/ML
40 INJECTION INTRAVENOUS ONCE
Status: COMPLETED | OUTPATIENT
Start: 2017-11-19 | End: 2017-11-19

## 2017-11-19 RX ORDER — MAGNESIUM OXIDE 400 MG (241.3 MG MAGNESIUM) TABLET
400 TABLET ONCE
Status: COMPLETED | OUTPATIENT
Start: 2017-11-19 | End: 2017-11-19

## 2017-11-19 NOTE — OCCUPATIONAL THERAPY NOTE
[facility] OCCUPATIONAL THERAPY TREATMENT NOTE - INPATIENT     Room Number: 338/338-A          Presenting Problem: LBP    Problem List  Principal Problem:    Acute exacerbation of chronic low back pain  Active Problems:    Epidural abscess      ASSESSMENT   Pt farnciscai 16  Approx Degree of Impairment: 53.32%  Standardized Score (AM-PAC Scale): 35.96  CMS Modifier (G-Code): CK    FUNCTIONAL TRANSFER ASSESSMENT  Supine to Sit : Minimum assistance  Sit to Stand: Moderate assistance  Toilet Transfer:  Mod A  Shower Transfer:

## 2017-11-19 NOTE — PROGRESS NOTES
[facility] Hospitalist Progress Note     CC: Hospital Follow up    PCP: Robby Deshpande       Assessment/Plan:     Principal Problem:    Acute exacerbation of chronic low back pain  Active Problems:    Epidural abscess    Ms. Joseph Fearrenan is a 81 yo F with PMH of b initially not in correct area, since replaced by picc team    Distension of uterine cavity  - seen on MRI pelvis; heterogenous fluid, likely hematometra-pyometra  - no hx of post-menopausal bleeding, does not know when last pap smear was  - US pelvis with kg)  11/18/17 0400 : 94 lb 11.2 oz (43 kg)  11/17/17 0604 : 99 lb 3.2 oz (45 kg)  11/16/17 0700 : 98 lb (44.5 kg)  11/11/17 1716 : 90 lb 8 oz (41.1 kg)  11/11/17 1404 : 100 lb (45.4 kg)  11/08/17 1256 : 95 lb (43.1 kg)  11/08/17 1258 : 95 lb (43.1 kg) mirtazapine  7.5 mg Oral Nightly   • Insulin Aspart Pen  1-5 Units Subcutaneous TID CC       acetaminophen, Normal Saline Flush, influenza virus vaccine PF, Normal Saline Flush, ondansetron HCl, dextrose 50%, Glucose-Vitamin C

## 2017-11-19 NOTE — PROGRESS NOTES
[facility] AND [facility] Infectious Disease  Progress Note    Renae Villeda Patient Status:  Inpatient    1921 MRN [id number]   Location [facility] 3W/SW Attending Mary Davis MD   Hosp Day # 8 PCP Juan Turner     Subjective:  Mackenzie L3-L4 spinal canal stenosis, thecal sac measuring less  than 5 mm. NO drainable paraspinal fluid collection is detected on this noncontrast evaluation.   2. Chronic superior L4 compression fracture with suggestion of early osteolytic changes of the  superio

## 2017-11-19 NOTE — PLAN OF CARE
HEMATOLOGIC - ADULT    • Free from bleeding injury Progressing        SAFETY ADULT - FALL    • Free from fall injury Progressing

## 2017-11-19 NOTE — PROGRESS NOTES
Assessment and Plan:     1. Acute exacerbation of chronic low back pain  - epidural abscess  2. Tachycardia  - non-sustained atrial tach  3. Acute on chronic systolic and diastolic heart failure  - EF 35-40% 11/17  - EF 55-60% 9/17  4.  Pulmonary HTN--sev 1. 6*  2.8*                   CXR 11/15/17  CONCLUSION:   1. Left PICC line tip in left innominate vein. 2. Mild CHF with cardiomegaly, pulmonary venous congestion, and small bibasilar pleural effusions.   3. Atelectasis with or without pneumonia in the le

## 2017-11-20 PROCEDURE — 97116 GAIT TRAINING THERAPY: CPT

## 2017-11-20 PROCEDURE — 82962 GLUCOSE BLOOD TEST: CPT

## 2017-11-20 PROCEDURE — 80048 BASIC METABOLIC PNL TOTAL CA: CPT | Performed by: HOSPITALIST

## 2017-11-20 PROCEDURE — 97110 THERAPEUTIC EXERCISES: CPT

## 2017-11-20 PROCEDURE — 85610 PROTHROMBIN TIME: CPT | Performed by: HOSPITALIST

## 2017-11-20 PROCEDURE — 83735 ASSAY OF MAGNESIUM: CPT | Performed by: HOSPITALIST

## 2017-11-20 PROCEDURE — 85025 COMPLETE CBC W/AUTO DIFF WBC: CPT | Performed by: HOSPITALIST

## 2017-11-20 RX ORDER — MAGNESIUM OXIDE 400 MG (241.3 MG MAGNESIUM) TABLET
400 TABLET ONCE
Status: COMPLETED | OUTPATIENT
Start: 2017-11-20 | End: 2017-11-20

## 2017-11-20 RX ORDER — MELATONIN
325 2 TIMES DAILY WITH MEALS
Status: DISCONTINUED | OUTPATIENT
Start: 2017-11-20 | End: 2017-11-21

## 2017-11-20 NOTE — PROGRESS NOTES
Assessment and Plan:     1. Acute exacerbation of chronic low back pain  - epidural abscess  2.  Tachycardia  - non-sustained atrial tach; occasional sinus tach  3. Acute on chronic systolic and diastolic heart failure  - EF 35-40% 11/17 (?tachy mediated) 11/19/17   0614  11/20/17   0550   INR  1.6*  2.8*  3.3*       Xr Knee Routine (3 Views), Left (cpt=73562)    Result Date: 11/19/2017  CONCLUSION:   Post left total knee arthroplasty. No radiographic evidence of hardware complication.   Osseous demineraliza

## 2017-11-20 NOTE — PLAN OF CARE
HEMATOLOGIC - ADULT    • Free from bleeding injury Progressing    WNL, no s/s bleeding noted    Patient Centered Care    • Patient preferences are identified and integrated in the patient's plan of care Progressing        Patient/Family Goals    • Patient/

## 2017-11-20 NOTE — PROGRESS NOTES
[facility] Hospitalist Progress Note     CC: Hospital Follow up    PCP: Juan Turner       Assessment/Plan:     Principal Problem:    Acute exacerbation of chronic low back pain  Active Problems:    Epidural abscess    Ms. Rhiannon Paez is a 79 yo F with PMH of b by picc team    Distension of uterine cavity  - seen on MRI pelvis; heterogenous fluid, likely hematometra-pyometra  - no hx of post-menopausal bleeding, does not know when last pap smear was  - US pelvis with moderate endometrial fluid, GYN did not think 0400 : 94 lb 11.2 oz (43 kg)  11/17/17 0604 : 99 lb 3.2 oz (45 kg)  11/16/17 0700 : 98 lb (44.5 kg)  11/11/17 1716 : 90 lb 8 oz (41.1 kg)  11/11/17 1404 : 100 lb (45.4 kg)  11/08/17 1256 : 95 lb (43.1 kg)  11/08/17 1258 : 95 lb (43.1 kg)      Exam   GEN: e Oral Nightly   • Insulin Aspart Pen  1-5 Units Subcutaneous TID CC       acetaminophen, Normal Saline Flush, influenza virus vaccine PF, Normal Saline Flush, ondansetron HCl, dextrose 50%, Glucose-Vitamin C

## 2017-11-20 NOTE — PLAN OF CARE
Problem: Patient Centered Care  Goal: Patient preferences are identified and integrated in the patient's plan of care  Interventions:  - What would you like us to know as we care for you?  Keep me informed  - Provide timely, complete, and accurate informati of occasional pain to the left knee relieved by heat packs.     Problem: HEMATOLOGIC - ADULT  Goal: Free from bleeding injury  (Example usage: patient with low platelets)  INTERVENTIONS:  - Avoid intramuscular injections, enemas and rectal medication admini

## 2017-11-20 NOTE — PROGRESS NOTES
[facility] AND CLINICS  [facility] Infectious Disease  Progress Note    Shara Roy Patient Status:  Inpatient    1921 MRN [id number]   Location [facility] 3W/SW Attending Jose Clay MD   Hosp Day # 9 PCP Delia Marvin     Subjective:  Mackenzie L3-L4 spinal canal stenosis, thecal sac measuring less than 5 mm. NO drainable paraspinal fluid collection is detected on this noncontrast evaluation.     2. Chronic superior L4 compression fracture with suggestion of early osteolytic changes of the superio

## 2017-11-21 VITALS
BODY MASS INDEX: 17.6 KG/M2 | TEMPERATURE: 98 F | HEIGHT: 59 IN | WEIGHT: 87.31 LBS | DIASTOLIC BLOOD PRESSURE: 65 MMHG | HEART RATE: 72 BPM | SYSTOLIC BLOOD PRESSURE: 111 MMHG | RESPIRATION RATE: 18 BRPM | OXYGEN SATURATION: 94 %

## 2017-11-21 PROCEDURE — 85025 COMPLETE CBC W/AUTO DIFF WBC: CPT | Performed by: HOSPITALIST

## 2017-11-21 PROCEDURE — 85610 PROTHROMBIN TIME: CPT | Performed by: INTERNAL MEDICINE

## 2017-11-21 PROCEDURE — 82962 GLUCOSE BLOOD TEST: CPT

## 2017-11-21 PROCEDURE — 80048 BASIC METABOLIC PNL TOTAL CA: CPT | Performed by: HOSPITALIST

## 2017-11-21 PROCEDURE — 83735 ASSAY OF MAGNESIUM: CPT | Performed by: HOSPITALIST

## 2017-11-21 RX ORDER — WARFARIN SODIUM 2.5 MG/1
2.5 TABLET ORAL DAILY
Qty: 30 TABLET | Refills: 0 | Status: SHIPPED | OUTPATIENT
Start: 2017-11-21 | End: 2017-11-21

## 2017-11-21 RX ORDER — WARFARIN SODIUM 2.5 MG/1
2.5 TABLET ORAL DAILY
Qty: 30 TABLET | Refills: 0 | Status: SHIPPED | OUTPATIENT
Start: 2017-11-21

## 2017-11-21 RX ORDER — METOPROLOL SUCCINATE 100 MG/1
100 TABLET, EXTENDED RELEASE ORAL DAILY
Qty: 30 TABLET | Refills: 0 | Status: SHIPPED | OUTPATIENT
Start: 2017-11-21

## 2017-11-21 RX ORDER — 0.9 % SODIUM CHLORIDE 0.9 %
VIAL (ML) INJECTION
Status: COMPLETED
Start: 2017-11-21 | End: 2017-11-21

## 2017-11-21 RX ORDER — POTASSIUM CHLORIDE 29.8 MG/ML
40 INJECTION INTRAVENOUS ONCE
Status: COMPLETED | OUTPATIENT
Start: 2017-11-21 | End: 2017-11-21

## 2017-11-21 RX ORDER — WARFARIN SODIUM 3 MG/1
3 TABLET ORAL DAILY
Qty: 30 TABLET | Refills: 0 | Status: SHIPPED | OUTPATIENT
Start: 2017-11-21 | End: 2017-11-21

## 2017-11-21 NOTE — PROGRESS NOTES
Assessment and Plan:     1. Acute exacerbation of chronic low back pain  - epidural abscess  2. Tachycardia  - non-sustained atrial tach; occasional sinus tach  - heart rates better  3.  Acute on chronic systolic and diastolic heart failure  - EF 35-40% 1 11/19/17   0614  11/20/17   0550  11/21/17   0535   INR  2.8*  3.3*  3.3*       Xr Knee Routine (3 Views), Left (cpt=73562)    Result Date: 11/19/2017  CONCLUSION:   Post left total knee arthroplasty. No radiographic evidence of hardware complication.   Major Hamburg

## 2017-11-21 NOTE — PHYSICAL THERAPY NOTE
PHYSICAL THERAPY TREATMENT NOTE - INPATIENT    Room Number: 338/338-A       Presenting Problem: LBP, radicular pain into LLE    Problem List  Principal Problem:    Acute exacerbation of chronic low back pain  Active Problems:    Epidural abscess      ASSE bedclothes, sheets and blankets)?: A Little   -   Sitting down on and standing up from a chair with arms (e.g., wheelchair, bedside commode, etc.): A Little   -   Moving from lying on back to sitting on the side of the bed?: A Little   How much help from a Goal #6     Goal #6  Current Status

## 2017-11-21 NOTE — CM/SW NOTE
11CM-The Patient's RN informed this Writer that the Patient is medically stable for discharge today (11/21). This Writer informed Rex Felder of the above, they are able to accept  the Patient  today (11/21) for transfer at 6:00p.m.  This Writer informed the

## 2017-11-21 NOTE — DISCHARGE SUMMARY
General Medicine Discharge Summary     Patient ID:  Renae Villeda  80year old  7/22/1921    Admit date: 11/11/2017    Discharge date and time: 11/21/17    Attending Physician: Mary Davis MD     Consults: IP CONSULT TO ORTHOPEDIC SURGERY  IP CONSULT TO back pain for over 1 year, has RLE radicular pain. Has been in rehab, not improving, had MRI L-spine done yesterday, findings concerning for possible epidural abscess. No symptoms of fevers/chills/worsening back pain. No urinary incontinence.  Has been havi outpatient   - FU cardiology      Inappropriate Sinus tachycardia  - denies pain, CP, SOB, palpitations, no hx of arrhythmia  - ECHO as above  - BB titrated, now HR improved  - cards fu     Right lower ext DVT / tachycardia  - given tachycardia, and lower COUMADIN  Take 1 tablet (2.5 mg total) by mouth daily. CHANGE how you take these medications    acetaminophen 325 MG Tabs  Commonly known as:  TYLENOL  What changed:  Another medication with the same name was removed.  Continue taking this medication FU  Follow-up Information     Maria Elena Blood DO. Schedule an appointment as soon as possible for a visit in 2 weeks.     Specialties:  INFECTIOUS DISEASES, Internal Medicine  Contact information:  [address] Medication List      START taking these medications    sodium chloride 0.9 % SOLN 100 mL with CefTRIAXone Sodium 2 g SOLR 2 g  Inject 2 g into the vein daily. vancomycin 50 MG/ML Soln  Take 2.5 mL (125 mg total) by mouth 2 (two) times daily.      Warf Your Medications      These medications were sent to [address] [place] [address] Pkw[address], [address] [phone], [phone] [address] [facility] [place] [address]    Phone:  [phone]   · Warfarin Sodium 2.5 MG Tabs     You can get these medications from any

## 2017-11-21 NOTE — PROGRESS NOTES
[facility] AND [facility] Infectious Disease  Progress Note    Suzanne Paul Patient Status:  Inpatient    1921 MRN [id number]   Location [facility] 3W/SW Attending Michelle Damico MD   Hosp Day # 10 PCP Pierre Wei     Subjective:  Johnny severe L3-L4 spinal canal stenosis, thecal sac measuring less than 5 mm. NO drainable paraspinal fluid collection is detected on this noncontrast evaluation.     2.  Chronic superior L4 compression fracture with suggestion of early osteolytic changes of the

## 2017-11-21 NOTE — PLAN OF CARE
Problem: HEMATOLOGIC - ADULT  Goal: Free from bleeding injury  (Example usage: patient with low platelets)  INTERVENTIONS:  - Avoid intramuscular injections, enemas and rectal medication administration  - Ensure safe mobilization of patient  - Hold pressur

## 2017-11-21 NOTE — DIETARY NOTE
ADULT NUTRITION REASSESSMENT    Pt is at moderate nutrition risk. Pt meets malnutrition criteria. RECOMMENDATIONS TO MD:   CARMELLA educated pt on taking Oral Nutritional Supplements (ONS) to maximize po.  Also liberalized diet to General to allow increase d/c    ADMITTING DIAGNOSIS: Acute exac of chronic low back pain. Epidural abscess, urosepsis. PERTINENT PAST MEDICAL HISTORY: Breast CA, DM, Legally blind, CKD, others as documented.     ANTHROPOMETRICS:  HT: 149.9 cm (4' 11\")       WT: 41.1 kg (90 lb 8 Monitor: wt  - Nutrition Goals: halt wt loss, regain wt as able, PO and supplement greater than 75% of needs, labs WNL and prevent skin breakdown    DIETITIAN FOLLOW UP:  RD to follow up within 7 days.    [address], [address] [facility]  Ext [phone]

## 2017-11-21 NOTE — PLAN OF CARE
Problem: SAFETY ADULT - FALL  Goal: Free from fall injury  INTERVENTIONS:  - Assess pt frequently for physical needs  - Identify cognitive and physical deficits and behaviors that affect risk of falls.   - [place] fall precautions as indicated by assessme

## 2017-11-22 NOTE — PROGRESS NOTES
Pt discharged to [facility] with PICC line and plan for long-term abx. Nursing report given to Elen Trevizo Dr. Pt did not urinate this shift. Last bladder scan was 130cc. I discussed this with Dr Sofía Logan and pt is still appropriate for discharge to SNF.  I discussed

## 2017-12-21 LAB
ALBUMIN SERPL-MCNC: 2.8 GM/DL (ref 3.6–5.1)
ALBUMIN/GLOB SERPL: 0.7 {RATIO} (ref 1–2.4)
ALP SERPL-CCNC: 107 UNIT/L (ref 45–117)
ALT SERPL-CCNC: 19 UNIT/L
AMORPH SED URNS QL MICRO: ABNORMAL
ANALYZER ANC (IANC): ABNORMAL
ANION GAP SERPL CALC-SCNC: 18 MMOL/L (ref 10–20)
APPEARANCE UR: ABNORMAL
APPEARANCE UR: ABNORMAL
AST SERPL-CCNC: 33 UNIT/L
BACTERIA #/AREA URNS HPF: ABNORMAL /HPF
BASOPHILS # BLD: 0 THOUSAND/MCL (ref 0–0.3)
BASOPHILS NFR BLD: 0 %
BILIRUB SERPL-MCNC: 0.4 MG/DL (ref 0.2–1)
BILIRUB UR QL STRIP: NEGATIVE
BILIRUB UR QL: NEGATIVE
BUN SERPL-MCNC: 17 MG/DL (ref 6–20)
BUN/CREAT SERPL: 19 (ref 7–25)
CALCIUM SERPL-MCNC: 8.6 MG/DL (ref 8.4–10.2)
CAOX CRY URNS QL MICRO: ABNORMAL
CHLORIDE: 105 MMOL/L (ref 98–107)
CO2 SERPL-SCNC: 22 MMOL/L (ref 21–32)
COLOR UR: YELLOW
COLOR UR: YELLOW
CREAT SERPL-MCNC: 0.91 MG/DL (ref 0.51–0.95)
DIFFERENTIAL METHOD BLD: ABNORMAL
EOSINOPHIL # BLD: 0.4 THOUSAND/MCL (ref 0.1–0.5)
EOSINOPHIL NFR BLD: 2 %
EPITH CASTS #/AREA URNS LPF: ABNORMAL /[LPF]
ERYTHROCYTE [DISTWIDTH] IN BLOOD: 15.5 % (ref 11–15)
FATTY CASTS #/AREA URNS LPF: ABNORMAL /[LPF]
GLOBULIN SER-MCNC: 4 GM/DL (ref 2–4)
GLUCOSE SERPL-MCNC: 105 MG/DL (ref 65–99)
GLUCOSE UR STRIP-MCNC: NEGATIVE MG/DL
GLUCOSE UR-MCNC: NEGATIVE MG/DL
GRAN CASTS #/AREA URNS LPF: ABNORMAL /[LPF]
HEMATOCRIT: 35.2 % (ref 36–46.5)
HEMOCCULT STL QL: NEGATIVE
HGB BLD-MCNC: 11.2 GM/DL (ref 12–15.5)
HGB UR QL: NEGATIVE
HYALINE CASTS #/AREA URNS LPF: ABNORMAL /LPF (ref 0–5)
INR PPP: 1.9
KETONES UR STRIP-MCNC: NEGATIVE MG/DL
KETONES UR-MCNC: NEGATIVE MG/DL
LACTATE BLDV-MCNC: 1.4 MMOL/L
LEUKOCYTE ESTERASE UR QL STRIP: ABNORMAL
LEUKOCYTE ESTERASE UR QL STRIP: NEGATIVE
LYMPHOCYTES # BLD: 2.1 THOUSAND/MCL (ref 1–4)
LYMPHOCYTES NFR BLD: 14 %
MCH RBC QN AUTO: 29.9 PG (ref 26–34)
MCHC RBC AUTO-ENTMCNC: 31.8 GM/DL (ref 32–36.5)
MCV RBC AUTO: 93.9 FL (ref 78–100)
MICROSCOPIC (MT): ABNORMAL
MIXED CELL CASTS #/AREA URNS LPF: ABNORMAL /[LPF]
MONOCYTES # BLD: 1.1 THOUSAND/MCL (ref 0.3–0.9)
MONOCYTES NFR BLD: 7 %
MUCOUS THREADS URNS QL MICRO: ABNORMAL
NEUTROPHILS # BLD: 11.4 THOUSAND/MCL (ref 1.8–7.7)
NEUTROPHILS NFR BLD: 77 %
NEUTS SEG NFR BLD: ABNORMAL %
NITRITE UR QL STRIP: NEGATIVE
NITRITE UR QL: NEGATIVE
PERCENT NRBC: ABNORMAL
PH UR STRIP: 5.5 UNIT (ref 5–7)
PH UR: 5 UNIT (ref 5–7)
PLATELET # BLD: 283 THOUSAND/MCL (ref 140–450)
POTASSIUM SERPL-SCNC: 5 MMOL/L (ref 3.4–5.1)
PROT SERPL-MCNC: 6.8 GM/DL (ref 6.4–8.2)
PROT UR QL: 30 MG/DL
PROT UR STRIP-MCNC: 30 MG/DL
PROTHROMBIN TIME: 21.2 SECONDS (ref 9.7–11.8)
PROTHROMBIN TIME: ABNORMAL
RBC # BLD: 3.75 MILLION/MCL (ref 4–5.2)
RBC #/AREA URNS HPF: ABNORMAL /HPF (ref 0–3)
RBC CASTS #/AREA URNS LPF: ABNORMAL /[LPF]
RENAL EPI CELLS #/AREA URNS HPF: ABNORMAL /[HPF]
SODIUM SERPL-SCNC: 140 MMOL/L (ref 135–145)
SP GR UR STRIP: 1.02 (ref 1–1.03)
SP GR UR: 1.01 (ref 1–1.03)
SPECIMEN SOURCE: ABNORMAL
SPERM URNS QL MICRO: ABNORMAL
SQUAMOUS #/AREA URNS HPF: ABNORMAL /HPF (ref 0–5)
T VAGINALIS URNS QL MICRO: ABNORMAL
TRI-PHOS CRY URNS QL MICRO: ABNORMAL
TROPONIN I SERPL HS-MCNC: <0.02 NG/ML
URATE CRY URNS QL MICRO: ABNORMAL
URNS CMNT MICRO: ABNORMAL
UROBILINOGEN UR QL: 0.2 MG/DL (ref 0–1)
UROBILINOGEN UR STRIP-MCNC: 0.2 MG/DL (ref 0–1)
WAXY CASTS #/AREA URNS LPF: ABNORMAL /[LPF]
WBC # BLD: 14.9 THOUSAND/MCL (ref 4.2–11)
WBC #/AREA URNS HPF: ABNORMAL /HPF (ref 0–5)
WBC CASTS #/AREA URNS LPF: ABNORMAL /[LPF]
YEAST HYPHAE URNS QL MICRO: ABNORMAL
YEAST URNS QL MICRO: PRESENT

## 2017-12-22 ENCOUNTER — HOSPITAL (OUTPATIENT)
Dept: OTHER | Age: 82
End: 2017-12-22
Attending: HOSPITALIST

## 2017-12-22 ENCOUNTER — DIAGNOSTIC TRANS (OUTPATIENT)
Dept: OTHER | Age: 82
End: 2017-12-22

## 2017-12-22 ENCOUNTER — CHARTING TRANS (OUTPATIENT)
Dept: OTHER | Age: 82
End: 2017-12-22

## 2017-12-22 LAB
2009 H1N1 SUBTYPE (RF1N1): NOT DETECTED
ADENOVIRUS (RADENO): NOT DETECTED
BOCAVIRUS (RBOCA): NOT DETECTED
C. PNEUMONIAE (RCHLP): NOT DETECTED
CORONAVIRUS 229E (RC229E): NOT DETECTED
CORONAVIRUS HKU1 (RCHKU1): NOT DETECTED
CORONAVIRUS NL63 (RCNL63): NOT DETECTED
CORONAVIRUS OC43 (RCO43): NOT DETECTED
GLUCOSE BLDC GLUCOMTR-MCNC: 123 MG/DL (ref 65–99)
GLUCOSE BLDC GLUCOMTR-MCNC: 143 MG/DL (ref 65–99)
GLUCOSE BLDC GLUCOMTR-MCNC: 144 MG/DL (ref 65–99)
GLUCOSE BLDC GLUCOMTR-MCNC: 160 MG/DL (ref 65–99)
GLUCOSE BLDC GLUCOMTR-MCNC: 92 MG/DL (ref 65–99)
INFLUENZA A SUBTYPE H1 (RFLH1): NOT DETECTED
INFLUENZA A SUBTYPE H3 (RFLH3): NOT DETECTED
INFLUENZA A UNSUBTYPABLE (RIAU): NOT DETECTED
INFLUENZA B VIRUS (XFLUB): NOT DETECTED
LACTATE BLDV-SCNC: 1.4 MMOL/L (ref 0–2)
LACTATE BLDV-SCNC: 2 MMOL/L (ref 0–2)
M. PNEUMONIAE (RMYPP): NOT DETECTED
METAPNEUMOVIRUS (RMETA): NOT DETECTED
PARAINFLUENZA, TYPE 1 (RPAR1): NOT DETECTED
PARAINFLUENZA, TYPE 2 (RPAR2): NOT DETECTED
PARAINFLUENZA, TYPE 3 (RPAR3): NOT DETECTED
PARAINFLUENZA, TYPE 4 (RPAR4): NOT DETECTED
RHINOVIRUS/ENTEROVIRUS (RRHINO): NOT DETECTED
RSV, SUBTYPE A (RRSVA): NOT DETECTED
RSV, SUBTYPE B (RRSVB): NOT DETECTED
SPECIMEN SOURCE: NORMAL

## 2017-12-23 LAB
ALBUMIN SERPL-MCNC: 2.6 GM/DL (ref 3.6–5.1)
ALBUMIN/GLOB SERPL: 0.8 {RATIO} (ref 1–2.4)
ALP SERPL-CCNC: 101 UNIT/L (ref 45–117)
ALT SERPL-CCNC: 18 UNIT/L
ANALYZER ANC (IANC): ABNORMAL
ANION GAP SERPL CALC-SCNC: 12 MMOL/L (ref 10–20)
AST SERPL-CCNC: 20 UNIT/L
BASOPHILS # BLD: 0 THOUSAND/MCL (ref 0–0.3)
BASOPHILS NFR BLD: 0 %
BILIRUB SERPL-MCNC: 0.4 MG/DL (ref 0.2–1)
BUN SERPL-MCNC: 13 MG/DL (ref 6–20)
BUN/CREAT SERPL: 19 (ref 7–25)
CALCIUM SERPL-MCNC: 8.5 MG/DL (ref 8.4–10.2)
CHLORIDE: 104 MMOL/L (ref 98–107)
CO2 SERPL-SCNC: 25 MMOL/L (ref 21–32)
CREAT SERPL-MCNC: 0.7 MG/DL (ref 0.51–0.95)
DIFFERENTIAL METHOD BLD: ABNORMAL
EOSINOPHIL # BLD: 0.4 THOUSAND/MCL (ref 0.1–0.5)
EOSINOPHIL NFR BLD: 5 %
ERYTHROCYTE [DISTWIDTH] IN BLOOD: 15.1 % (ref 11–15)
GLOBULIN SER-MCNC: 3.4 GM/DL (ref 2–4)
GLUCOSE BLDC GLUCOMTR-MCNC: 113 MG/DL (ref 65–99)
GLUCOSE BLDC GLUCOMTR-MCNC: 139 MG/DL (ref 65–99)
GLUCOSE BLDC GLUCOMTR-MCNC: 92 MG/DL (ref 65–99)
GLUCOSE BLDC GLUCOMTR-MCNC: 95 MG/DL (ref 65–99)
GLUCOSE SERPL-MCNC: 120 MG/DL (ref 65–99)
HEMATOCRIT: 31.4 % (ref 36–46.5)
HGB BLD-MCNC: 10.2 GM/DL (ref 12–15.5)
INR PPP: 1.6
LYMPHOCYTES # BLD: 1.5 THOUSAND/MCL (ref 1–4)
LYMPHOCYTES NFR BLD: 17 %
MCH RBC QN AUTO: 29.6 PG (ref 26–34)
MCHC RBC AUTO-ENTMCNC: 32.5 GM/DL (ref 32–36.5)
MCV RBC AUTO: 91 FL (ref 78–100)
MONOCYTES # BLD: 0.8 THOUSAND/MCL (ref 0.3–0.9)
MONOCYTES NFR BLD: 9 %
NEUTROPHILS # BLD: 5.8 THOUSAND/MCL (ref 1.8–7.7)
NEUTROPHILS NFR BLD: 69 %
NEUTS SEG NFR BLD: ABNORMAL %
PERCENT NRBC: ABNORMAL
PLATELET # BLD: 268 THOUSAND/MCL (ref 140–450)
POTASSIUM SERPL-SCNC: 3.8 MMOL/L (ref 3.4–5.1)
PROCALCITONIN SERPL IA-MCNC: 0.09 NG/ML
PROT SERPL-MCNC: 6 GM/DL (ref 6.4–8.2)
PROTHROMBIN TIME: 17.6 SECONDS (ref 9.7–11.8)
PROTHROMBIN TIME: ABNORMAL
RBC # BLD: 3.45 MILLION/MCL (ref 4–5.2)
SODIUM SERPL-SCNC: 137 MMOL/L (ref 135–145)
WBC # BLD: 8.4 THOUSAND/MCL (ref 4.2–11)

## 2017-12-24 LAB
ALBUMIN SERPL-MCNC: 2.4 GM/DL (ref 3.6–5.1)
ALBUMIN/GLOB SERPL: 0.7 {RATIO} (ref 1–2.4)
ALP SERPL-CCNC: 92 UNIT/L (ref 45–117)
ALT SERPL-CCNC: 14 UNIT/L
AMMONIA PLAS-SCNC: <10 MCMOL/L
AMORPH SED URNS QL MICRO: ABNORMAL
ANALYZER ANC (IANC): ABNORMAL
ANION GAP SERPL CALC-SCNC: 10 MMOL/L (ref 10–20)
APPEARANCE UR: ABNORMAL
AST SERPL-CCNC: 18 UNIT/L
BACTERIA #/AREA URNS HPF: ABNORMAL /HPF
BASE DEFICIT BLDA-SCNC: 1 MMOL/L (ref 0–2)
BASE EXCESS BLDA CALC-SCNC: ABNORMAL MMOL/L
BASOPHILS # BLD: 0 THOUSAND/MCL (ref 0–0.3)
BASOPHILS NFR BLD: 0 %
BDY SITE: ABNORMAL
BILIRUB SERPL-MCNC: 0.4 MG/DL (ref 0.2–1)
BILIRUB UR QL: NEGATIVE
BODY TEMPERATURE: 37 DEGREES
BUN SERPL-MCNC: 11 MG/DL (ref 6–20)
BUN/CREAT SERPL: 15 (ref 7–25)
CA-I BLDA-SCNC: 14 % (ref 15–23)
CALCIUM SERPL-MCNC: 8.5 MG/DL (ref 8.4–10.2)
CAOX CRY URNS QL MICRO: ABNORMAL
CHLORIDE: 105 MMOL/L (ref 98–107)
CO2 SERPL-SCNC: 26 MMOL/L (ref 21–32)
COHGB MFR BLD: 1.5 %
COLOR UR: YELLOW
CONDITION: ABNORMAL
CONDITION: ABNORMAL
CREAT SERPL-MCNC: 0.74 MG/DL (ref 0.51–0.95)
DIFFERENTIAL METHOD BLD: ABNORMAL
EOSINOPHIL # BLD: 0.5 THOUSAND/MCL (ref 0.1–0.5)
EOSINOPHIL NFR BLD: 6 %
EPITH CASTS #/AREA URNS LPF: ABNORMAL /[LPF]
ERYTHROCYTE [DISTWIDTH] IN BLOOD: 15 % (ref 11–15)
FATTY CASTS #/AREA URNS LPF: ABNORMAL /[LPF]
GLOBULIN SER-MCNC: 3.3 GM/DL (ref 2–4)
GLUCOSE BLDC GLUCOMTR-MCNC: 83 MG/DL (ref 65–99)
GLUCOSE BLDC GLUCOMTR-MCNC: 86 MG/DL (ref 65–99)
GLUCOSE BLDC GLUCOMTR-MCNC: 98 MG/DL (ref 65–99)
GLUCOSE BLDC GLUCOMTR-MCNC: 98 MG/DL (ref 65–99)
GLUCOSE SERPL-MCNC: 84 MG/DL (ref 65–99)
GLUCOSE UR-MCNC: NEGATIVE MG/DL
GRAN CASTS #/AREA URNS LPF: ABNORMAL /[LPF]
HCO3 BLDA-SCNC: 24 MMOL/L (ref 22–28)
HCT VFR BLD CALC: 30 % (ref 36–46.5)
HEMATOCRIT: 30.4 % (ref 36–46.5)
HGB BLD-MCNC: 10 GM/DL (ref 12–15.5)
HGB BLD-MCNC: 9.7 GM/DL (ref 12–15.5)
HGB UR QL: ABNORMAL
HOROWITZ INDEX BLD+IHG-RTO: ABNORMAL MM[HG]
HYALINE CASTS #/AREA URNS LPF: ABNORMAL /LPF (ref 0–5)
INR PPP: 2
KETONES UR-MCNC: NEGATIVE MG/DL
LEUKOCYTE ESTERASE UR QL STRIP: ABNORMAL
LYMPHOCYTES # BLD: 1.9 THOUSAND/MCL (ref 1–4)
LYMPHOCYTES NFR BLD: 26 %
MCH RBC QN AUTO: 29.2 PG (ref 26–34)
MCHC RBC AUTO-ENTMCNC: 31.9 GM/DL (ref 32–36.5)
MCV RBC AUTO: 91.6 FL (ref 78–100)
METHGB MFR BLD: 1.6 %
MIXED CELL CASTS #/AREA URNS LPF: ABNORMAL /[LPF]
MONOCYTES # BLD: 0.6 THOUSAND/MCL (ref 0.3–0.9)
MONOCYTES NFR BLD: 7 %
MUCOUS THREADS URNS QL MICRO: ABNORMAL
NEUTROPHILS # BLD: 4.5 THOUSAND/MCL (ref 1.8–7.7)
NEUTROPHILS NFR BLD: 61 %
NEUTS SEG NFR BLD: ABNORMAL %
NITRITE UR QL: NEGATIVE
OXYHGB MFR BLD: 96.1 % (ref 94–98)
PCO2 BLDA: 40 MM HG (ref 32–45)
PERCENT NRBC: ABNORMAL
PH BLDA: 7.39 UNIT (ref 7.35–7.45)
PH UR: 5 UNIT (ref 5–7)
PLATELET # BLD: 252 THOUSAND/MCL (ref 140–450)
PO2 BLDA: 146 MM HG (ref 83–108)
POTASSIUM SERPL-SCNC: 4.9 MMOL/L (ref 3.4–5.1)
PROT SERPL-MCNC: 5.7 GM/DL (ref 6.4–8.2)
PROT UR QL: NEGATIVE MG/DL
PROTHROMBIN TIME: 22.1 SECONDS (ref 9.7–11.8)
PROTHROMBIN TIME: ABNORMAL
RBC # BLD: 3.32 MILLION/MCL (ref 4–5.2)
RBC #/AREA URNS HPF: ABNORMAL /HPF (ref 0–3)
RBC CASTS #/AREA URNS LPF: ABNORMAL /[LPF]
RENAL EPI CELLS #/AREA URNS HPF: ABNORMAL /[HPF]
SAO2 % BLDA: 99 % (ref 95–99)
SODIUM SERPL-SCNC: 136 MMOL/L (ref 135–145)
SP GR UR: 1.01 (ref 1–1.03)
SPECIMEN SOURCE: ABNORMAL
SPERM URNS QL MICRO: ABNORMAL
SQUAMOUS #/AREA URNS HPF: ABNORMAL /HPF (ref 0–5)
T VAGINALIS URNS QL MICRO: ABNORMAL
TRANS CELLS #/AREA URNS HPF: ABNORMAL /HPF
TRI-PHOS CRY URNS QL MICRO: ABNORMAL
TROPONIN I SERPL HS-MCNC: 0.02 NG/ML
URATE CRY URNS QL MICRO: ABNORMAL
URINE REFLEX: ABNORMAL
URNS CMNT MICRO: ABNORMAL
UROBILINOGEN UR QL: 0.2 MG/DL (ref 0–1)
VANCOMYCIN SERPL-MCNC: 10.1 MCG/ML
WAXY CASTS #/AREA URNS LPF: ABNORMAL /[LPF]
WBC # BLD: 7.5 THOUSAND/MCL (ref 4.2–11)
WBC #/AREA URNS HPF: >100 /HPF (ref 0–5)
WBC CASTS #/AREA URNS LPF: ABNORMAL /[LPF]
YEAST HYPHAE URNS QL MICRO: ABNORMAL
YEAST URNS QL MICRO: ABNORMAL

## 2017-12-25 LAB
ANALYZER ANC (IANC): ABNORMAL
ANION GAP SERPL CALC-SCNC: 12 MMOL/L (ref 10–20)
BASOPHILS # BLD: 0.1 THOUSAND/MCL (ref 0–0.3)
BASOPHILS NFR BLD: 1 %
BUN SERPL-MCNC: 13 MG/DL (ref 6–20)
BUN/CREAT SERPL: 17 (ref 7–25)
CALCIUM SERPL-MCNC: 8.4 MG/DL (ref 8.4–10.2)
CHLORIDE: 105 MMOL/L (ref 98–107)
CO2 SERPL-SCNC: 25 MMOL/L (ref 21–32)
CREAT SERPL-MCNC: 0.76 MG/DL (ref 0.51–0.95)
CRP SERPL-MCNC: 1.9 MG/DL
DIFFERENTIAL METHOD BLD: ABNORMAL
EOSINOPHIL # BLD: 0.5 THOUSAND/MCL (ref 0.1–0.5)
EOSINOPHIL NFR BLD: 6 %
ERYTHROCYTE [DISTWIDTH] IN BLOOD: 15 % (ref 11–15)
GLUCOSE BLDC GLUCOMTR-MCNC: 115 MG/DL (ref 65–99)
GLUCOSE BLDC GLUCOMTR-MCNC: 70 MG/DL (ref 65–99)
GLUCOSE BLDC GLUCOMTR-MCNC: 87 MG/DL (ref 65–99)
GLUCOSE BLDC GLUCOMTR-MCNC: 87 MG/DL (ref 65–99)
GLUCOSE BLDC GLUCOMTR-MCNC: 90 MG/DL (ref 65–99)
GLUCOSE BLDC GLUCOMTR-MCNC: 99 MG/DL (ref 65–99)
GLUCOSE SERPL-MCNC: 93 MG/DL (ref 65–99)
HEMATOCRIT: 29.2 % (ref 36–46.5)
HGB BLD-MCNC: 9.2 GM/DL (ref 12–15.5)
INR PPP: 2.3
LYMPHOCYTES # BLD: 1.6 THOUSAND/MCL (ref 1–4)
LYMPHOCYTES NFR BLD: 22 %
MAGNESIUM SERPL-MCNC: 1.4 MG/DL (ref 1.7–2.4)
MCH RBC QN AUTO: 29 PG (ref 26–34)
MCHC RBC AUTO-ENTMCNC: 31.5 GM/DL (ref 32–36.5)
MCV RBC AUTO: 92.1 FL (ref 78–100)
MONOCYTES # BLD: 0.8 THOUSAND/MCL (ref 0.3–0.9)
MONOCYTES NFR BLD: 11 %
NEUTROPHILS # BLD: 4.7 THOUSAND/MCL (ref 1.8–7.7)
NEUTROPHILS NFR BLD: 60 %
NEUTS SEG NFR BLD: ABNORMAL %
PERCENT NRBC: ABNORMAL
PLATELET # BLD: 258 THOUSAND/MCL (ref 140–450)
POTASSIUM SERPL-SCNC: 4.7 MMOL/L (ref 3.4–5.1)
PROCALCITONIN SERPL IA-MCNC: 0.06 NG/ML
PROTHROMBIN TIME: 24.7 SECONDS (ref 9.7–11.8)
PROTHROMBIN TIME: ABNORMAL
RBC # BLD: 3.17 MILLION/MCL (ref 4–5.2)
SODIUM SERPL-SCNC: 137 MMOL/L (ref 135–145)
VANCOMYCIN SERPL-MCNC: 14.6 MCG/ML
WBC # BLD: 7.6 THOUSAND/MCL (ref 4.2–11)

## 2017-12-26 LAB
ALBUMIN SERPL-MCNC: 2.7 GM/DL (ref 3.6–5.1)
ALBUMIN/GLOB SERPL: 0.8 {RATIO} (ref 1–2.4)
ALP SERPL-CCNC: 99 UNIT/L (ref 45–117)
ALT SERPL-CCNC: 18 UNIT/L
ANALYZER ANC (IANC): ABNORMAL
ANION GAP SERPL CALC-SCNC: 19 MMOL/L (ref 10–20)
AST SERPL-CCNC: 22 UNIT/L
BASOPHILS # BLD: 0 THOUSAND/MCL (ref 0–0.3)
BASOPHILS NFR BLD: 0 %
BILIRUB SERPL-MCNC: 0.6 MG/DL (ref 0.2–1)
BUN SERPL-MCNC: 14 MG/DL (ref 6–20)
BUN/CREAT SERPL: 19 (ref 7–25)
CALCIUM SERPL-MCNC: 9.2 MG/DL (ref 8.4–10.2)
CHLORIDE: 104 MMOL/L (ref 98–107)
CO2 SERPL-SCNC: 20 MMOL/L (ref 21–32)
CREAT SERPL-MCNC: 0.75 MG/DL (ref 0.51–0.95)
DIFFERENTIAL METHOD BLD: ABNORMAL
EOSINOPHIL # BLD: 0.3 THOUSAND/MCL (ref 0.1–0.5)
EOSINOPHIL NFR BLD: 3 %
ERYTHROCYTE [DISTWIDTH] IN BLOOD: 14.9 % (ref 11–15)
GLOBULIN SER-MCNC: 3.6 GM/DL (ref 2–4)
GLUCOSE BLDC GLUCOMTR-MCNC: 107 MG/DL (ref 65–99)
GLUCOSE BLDC GLUCOMTR-MCNC: 108 MG/DL (ref 65–99)
GLUCOSE BLDC GLUCOMTR-MCNC: 124 MG/DL (ref 65–99)
GLUCOSE SERPL-MCNC: 106 MG/DL (ref 65–99)
HEMATOCRIT: 34 % (ref 36–46.5)
HGB BLD-MCNC: 10.7 GM/DL (ref 12–15.5)
INR PPP: 2.5
LYMPHOCYTES # BLD: 2.3 THOUSAND/MCL (ref 1–4)
LYMPHOCYTES NFR BLD: 21 %
MAGNESIUM SERPL-MCNC: 2.4 MG/DL (ref 1.7–2.4)
MCH RBC QN AUTO: 28.6 PG (ref 26–34)
MCHC RBC AUTO-ENTMCNC: 31.5 GM/DL (ref 32–36.5)
MCV RBC AUTO: 90.9 FL (ref 78–100)
MONOCYTES # BLD: 0.7 THOUSAND/MCL (ref 0.3–0.9)
MONOCYTES NFR BLD: 6 %
NEUTROPHILS # BLD: 7.6 THOUSAND/MCL (ref 1.8–7.7)
NEUTROPHILS NFR BLD: 70 %
NEUTS SEG NFR BLD: ABNORMAL %
PERCENT NRBC: ABNORMAL
PLATELET # BLD: 340 THOUSAND/MCL (ref 140–450)
POTASSIUM SERPL-SCNC: 4.8 MMOL/L (ref 3.4–5.1)
PROT SERPL-MCNC: 6.3 GM/DL (ref 6.4–8.2)
PROTHROMBIN TIME: 27.6 SECONDS (ref 9.7–11.8)
PROTHROMBIN TIME: ABNORMAL
RBC # BLD: 3.74 MILLION/MCL (ref 4–5.2)
SODIUM SERPL-SCNC: 138 MMOL/L (ref 135–145)
WBC # BLD: 10.9 THOUSAND/MCL (ref 4.2–11)

## 2018-01-25 ENCOUNTER — TELEPHONE (OUTPATIENT)
Dept: FAMILY MEDICINE CLINIC | Facility: CLINIC | Age: 83
End: 2018-01-25
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
## 2018-05-09 ENCOUNTER — HOSPITAL ENCOUNTER (EMERGENCY)
Facility: HOSPITAL | Age: 83
Discharge: NURSING, REHAB OR LONG-TERM CARE | End: 2018-05-09
Attending: EMERGENCY MEDICINE
Payer: MEDICARE

## 2018-05-09 ENCOUNTER — APPOINTMENT (OUTPATIENT)
Dept: GENERAL RADIOLOGY | Facility: HOSPITAL | Age: 83
End: 2018-05-09
Attending: EMERGENCY MEDICINE
Payer: MEDICARE

## 2018-05-09 ENCOUNTER — APPOINTMENT (OUTPATIENT)
Dept: CT IMAGING | Facility: HOSPITAL | Age: 83
End: 2018-05-09
Attending: EMERGENCY MEDICINE
Payer: MEDICARE

## 2018-05-09 VITALS
HEART RATE: 74 BPM | RESPIRATION RATE: 18 BRPM | WEIGHT: 94.81 LBS | OXYGEN SATURATION: 96 % | BODY MASS INDEX: 19 KG/M2 | DIASTOLIC BLOOD PRESSURE: 72 MMHG | TEMPERATURE: 98 F | SYSTOLIC BLOOD PRESSURE: 161 MMHG

## 2018-05-09 DIAGNOSIS — R40.4 TRANSIENT ALTERATION OF AWARENESS: Primary | ICD-10-CM

## 2018-05-09 PROCEDURE — 96360 HYDRATION IV INFUSION INIT: CPT

## 2018-05-09 PROCEDURE — 71045 X-RAY EXAM CHEST 1 VIEW: CPT | Performed by: EMERGENCY MEDICINE

## 2018-05-09 PROCEDURE — 85730 THROMBOPLASTIN TIME PARTIAL: CPT | Performed by: EMERGENCY MEDICINE

## 2018-05-09 PROCEDURE — 80053 COMPREHEN METABOLIC PANEL: CPT | Performed by: EMERGENCY MEDICINE

## 2018-05-09 PROCEDURE — 93005 ELECTROCARDIOGRAM TRACING: CPT

## 2018-05-09 PROCEDURE — 70450 CT HEAD/BRAIN W/O DYE: CPT | Performed by: EMERGENCY MEDICINE

## 2018-05-09 PROCEDURE — 99285 EMERGENCY DEPT VISIT HI MDM: CPT

## 2018-05-09 PROCEDURE — 81001 URINALYSIS AUTO W/SCOPE: CPT | Performed by: EMERGENCY MEDICINE

## 2018-05-09 PROCEDURE — 85025 COMPLETE CBC W/AUTO DIFF WBC: CPT | Performed by: EMERGENCY MEDICINE

## 2018-05-09 PROCEDURE — 93010 ELECTROCARDIOGRAM REPORT: CPT

## 2018-05-09 PROCEDURE — 85610 PROTHROMBIN TIME: CPT | Performed by: EMERGENCY MEDICINE

## 2018-05-09 RX ORDER — SODIUM CHLORIDE 9 MG/ML
INJECTION, SOLUTION INTRAVENOUS ONCE
Status: COMPLETED | OUTPATIENT
Start: 2018-05-09 | End: 2018-05-09

## 2018-05-09 NOTE — ED INITIAL ASSESSMENT (HPI)
Pt according to NH has AMS today--more confused today. Pt had unwitnessed fall yesterday and is on coumadin.  No c/o pain

## 2018-05-09 NOTE — ED PROVIDER NOTES
Patient Seen in: [facility] Emergency Department    History   Patient presents with:  Altered Mental Status (neurologic)  Fall (musculoskeletal, neurologic)    Stated Complaint: AMS today from NH; fell yesterday    HPI    77-year-old female complain Physical Exam  Patient is alert she is oriented ×1  She does report that she thinks is 18 and she thinks that stump is the president  HEENT exam is atraumatic pupils are equal round react to light extraprostatic oropharynx clear next nontender ba DRAW LIGHT GREEN   RAINBOW DRAW GOLD     EKG    Rate, intervals and axes as noted on EKG Report.   Rate: 73  Rhythm: Sinus Rhythm  Reading: Minimal voltage criteria for LVH may be normal variant borderline EKG           ED Course as of May 11 2306  --------

## 2018-07-05 ENCOUNTER — PRIOR ORIGINAL RECORDS (OUTPATIENT)
Dept: OTHER | Age: 83
End: 2018-07-05

## 2019-01-01 ENCOUNTER — HOSPITAL ENCOUNTER (EMERGENCY)
Facility: HOSPITAL | Age: 84
Discharge: NURSING, REHAB OR LONG-TERM CARE | End: 2019-01-01
Attending: EMERGENCY MEDICINE
Payer: MEDICARE

## 2019-01-01 ENCOUNTER — APPOINTMENT (OUTPATIENT)
Dept: GENERAL RADIOLOGY | Facility: HOSPITAL | Age: 84
End: 2019-01-01
Attending: EMERGENCY MEDICINE
Payer: MEDICARE

## 2019-01-01 ENCOUNTER — APPOINTMENT (OUTPATIENT)
Dept: CT IMAGING | Facility: HOSPITAL | Age: 84
End: 2019-01-01
Attending: EMERGENCY MEDICINE
Payer: MEDICARE

## 2019-01-01 VITALS
SYSTOLIC BLOOD PRESSURE: 168 MMHG | HEART RATE: 78 BPM | BODY MASS INDEX: 18.03 KG/M2 | OXYGEN SATURATION: 97 % | HEIGHT: 62 IN | RESPIRATION RATE: 16 BRPM | DIASTOLIC BLOOD PRESSURE: 68 MMHG | WEIGHT: 98 LBS | TEMPERATURE: 98 F

## 2019-01-01 DIAGNOSIS — S01.81XA FACIAL LACERATION, INITIAL ENCOUNTER: Primary | ICD-10-CM
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
## 2019-01-01 DIAGNOSIS — S01.21XA LACERATION OF NOSE, INITIAL ENCOUNTER: ICD-10-CM

## 2019-01-01 DIAGNOSIS — S22.31XA CLOSED FRACTURE OF ONE RIB OF RIGHT SIDE, INITIAL ENCOUNTER: ICD-10-CM

## 2019-01-01 DIAGNOSIS — S02.2XXA CLOSED FRACTURE OF NASAL BONE, INITIAL ENCOUNTER: ICD-10-CM

## 2019-01-01 PROCEDURE — 71101 X-RAY EXAM UNILAT RIBS/CHEST: CPT | Performed by: EMERGENCY MEDICINE

## 2019-01-01 PROCEDURE — 12014 RPR F/E/E/N/L/M 5.1-7.5 CM: CPT

## 2019-01-01 PROCEDURE — 72125 CT NECK SPINE W/O DYE: CPT | Performed by: EMERGENCY MEDICINE

## 2019-01-01 PROCEDURE — 70450 CT HEAD/BRAIN W/O DYE: CPT | Performed by: EMERGENCY MEDICINE

## 2019-01-01 PROCEDURE — 99285 EMERGENCY DEPT VISIT HI MDM: CPT

## 2019-01-01 PROCEDURE — 93005 ELECTROCARDIOGRAM TRACING: CPT

## 2019-01-01 PROCEDURE — 72110 X-RAY EXAM L-2 SPINE 4/>VWS: CPT | Performed by: EMERGENCY MEDICINE

## 2019-01-01 PROCEDURE — 21310 PR CLOSED TX NASAL BONE FX WITHOUT MANIPULATION: CPT

## 2019-01-01 PROCEDURE — 21310 HC CLOSED TX NASAL BONE FX WITHOUT MANIPULATION: CPT

## 2019-01-01 PROCEDURE — 93010 ELECTROCARDIOGRAM REPORT: CPT

## 2019-01-01 PROCEDURE — 73502 X-RAY EXAM HIP UNI 2-3 VIEWS: CPT | Performed by: EMERGENCY MEDICINE

## 2019-01-01 RX ORDER — LIDOCAINE HYDROCHLORIDE AND EPINEPHRINE 10; 10 MG/ML; UG/ML
20 INJECTION, SOLUTION INFILTRATION; PERINEURAL ONCE
Status: DISCONTINUED | OUTPATIENT
Start: 2019-01-01 | End: 2019-01-01

## 2019-01-01 RX ORDER — LIDOCAINE HYDROCHLORIDE AND EPINEPHRINE 20; 5 MG/ML; UG/ML
20 INJECTION, SOLUTION EPIDURAL; INFILTRATION; INTRACAUDAL; PERINEURAL ONCE
Status: DISCONTINUED | OUTPATIENT
Start: 2019-01-01 | End: 2019-01-01

## 2019-03-25 ENCOUNTER — HOSPITAL (OUTPATIENT)
Dept: OTHER | Age: 84
End: 2019-03-25
Attending: EMERGENCY MEDICINE

## 2019-06-04 NOTE — PLAN OF CARE
Problem: Patient Centered Care  Goal: Patient preferences are identified and integrated in the patient's plan of care  Interventions:  - What would you like us to know as we care for you?  Keep me informed  - Provide timely, complete, and accurate informati 21

## 2019-06-19 PROBLEM — S42.291D OTHER CLOSED DISPLACED FRACTURE OF PROXIMAL END OF RIGHT HUMERUS WITH ROUTINE HEALING, SUBSEQUENT ENCOUNTER: Status: ACTIVE | Noted: 2019-01-01

## 2019-12-10 NOTE — ED PROVIDER NOTES
Patient Seen in: [facility] Emergency Department      History   Patient presents with:  Laceration Abrasion  Fall    Stated Complaint: fall     HPI    30-year-old female presents from the [facility] care unit after a unwitnessed fall.   P negative except as noted above.     Physical Exam     ED Triage Vitals [12/10/19 1428]   BP (!) 173/86   Pulse 75   Resp 18   Temp 97.9 °F (36.6 °C)   Temp src Temporal   SpO2 96 %   O2 Device None (Room air)       Current:BP (!) 183/74   Pulse 72   Temp 97 Report. Rate: 74  Rhythm: Sinus Rhythm  Reading: Normal sinus rhythm, no acute ST or T wave abnormality.   No change compared to EKG dated May 9, 2018              Forehead laceration:  2.5 cm forehead laceration was anesthetized with lidocaine with epinep rib of right side, initial encounter  Fall, initial encounter  Closed fracture of nasal bone, initial encounter    Disposition:  Discharge  12/10/2019  6:32 pm    Follow-up:  Michel Chavez MD  [address] [place] [phone]
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
## 2019-12-10 NOTE — ED NOTES
RN spoke with pts AURELIANO Hallman and updated him about POC.  Pt expressed to RN that she is scared to go back to [place] because she has no help there, RN addressed this issue with Esthela Hallman, he states that pt is safe that it is a skilled nursing facility that

## 2019-12-11 NOTE — ED NOTES
[facility] ambulance was called for transfer to [address] [place] [address] 90 minutes  They are turning over the phone call to another ambulance company  They will call us back shortly

## 2019-12-11 NOTE — ED NOTES
Spoke with Lakisha Enriquez RN at [facility].   Discussed patient's discharge instructions

## 2020-04-21 ENCOUNTER — ADVANCED DIRECTIVES (OUTPATIENT)
Dept: HEALTH INFORMATION MANAGEMENT | Age: 85
End: 2020-04-21

## 2020-10-11 VITALS — WEIGHT: 103.99 LBS | DIASTOLIC BLOOD PRESSURE: 68 MMHG | SYSTOLIC BLOOD PRESSURE: 140 MMHG

## (undated) NOTE — ED AVS SNAPSHOT
Umer Vela   MRN: [id number]    Department:  [facility] Emergency Department   Date of Visit:  5/9/2018           Disclosure     Insurance plans vary and the physician(s) referred by the ER may not be covered by your plan.  Please contact yo tell this physician (or your personal doctor if your instructions are to return to your personal doctor) about any new or lasting problems. The primary care or specialist physician will see patients referred from the [facility] Emergency Department.  Devendra Bower
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
## (undated) NOTE — IP AVS SNAPSHOT
[address]            (For Outpatient Use Only) Initial Admit Date: 9/16/2017   Inpt/Obs Admit Date: Inpt: 9/16/17 / Obs: N/A   Discharge Date:    Kendra Tello:  [de-identified]   MRN: [de-identified]   CSN: [id number]        ENCOUNTER  Patient Subscriber Name:  Miguel Alex :    Subscriber ID:  Pt Rel to Subscriber:    Hospital Account Financial Class: Medicare    2017

## (undated) NOTE — LETTER
[address] [place] [address]  Authorization for Invasive Procedures  1.  I hereby authorize Dr. Jennifer Thompson, my physician and whomever may be designated as the doctor's assistant, to perform the following operation and/or procedure:  Daphnie performed for the purposes of advancing medicine, science, and/or education, provided my identity is not revealed. If the procedure has been videotaped, the physician/surgeon will obtain the original videotape.  The hospital will not be responsible for stor My signature below affirms that prior to the time of the procedure, I have explained to the patient and/or her legal representative, the risks and benefits involved in the proposed treatment and any reasonable alternative to the proposed treatment.  I have

## (undated) NOTE — ED AVS SNAPSHOT
Roro Beasley   MRN: [id number]    Department:  [facility] Emergency Department   Date of Visit:  12/10/2019           Disclosure     Insurance plans vary and the physician(s) referred by the ER may not be covered by your plan.  Please contact tell this physician (or your personal doctor if your instructions are to return to your personal doctor) about any new or lasting problems. The primary care or specialist physician will see patients referred from the [facility] Emergency Department.  Nunu Nicolas

## (undated) NOTE — IP AVS SNAPSHOT
Patient Demographics     Address  [address] Phone  [phone] [facility])  [phone] [facility] E-mail Address  [email or web address]      Emergency Contact(s)     Name Relation Home Work Mobile    [employer] What changed:  Another medication with the same name was removed. Continue taking this medication, and follow the directions you see here. lidocaine 5 % Ptch  Commonly known as:  LIDODERM  Place 1 patch onto the skin daily.   What changed:  additional i · vancomycin 50 MG/ML Soln       NUTRITION:  Continue sugar free oral nutritional supplement 1-2 daily. Follow-up Information     Anisha Vora DO. Schedule an appointment as soon as possible for a visit in 2 weeks.     Specialties:  INFECTIOUS D Next dose due: Tomorrow morning      Take 20 mg by mouth every other day. lidocaine 5 % Ptch  Commonly known as:  LIDODERM  Next dose due: Tomorrow morning      Place 1 patch onto the skin daily.    Kristen Youssef MD         melatonin 1 MG Tabs  N Metoprolol Succinate  MG Tb24  sodium chloride 0.9 % SOLN 100 mL with CefTRIAXone Sodium 2 g SOLR 2 g  vancomycin 50 MG/ML Soln  Warfarin Sodium 2.5 MG Tabs           [id number] - MAR ACTION REPORT  (last 24 hrs)    ** SITE UNKNOWN **     Order ID Med Ordering provider:  Syeda Jenkins MD  11/20/17 6091 Resulting lab:  [facility]    Specimen Information    Type Source Collected On   Blood — 11/21/17 6665          Components    Component Value Reference Range Flag Lab   Magnesium 1.9 1.8 - 2.5 mg/dL — [facility] receiving anticoagulant therapy may be seen in  factor deficiency, vitamin K deficiency, liver  disease, etc. Clinical correlation is recommended.        INR 3.3 0.9 - 1.2 [facility]   Comment:           Only the INR (not the Protime value) should be ut Specimen:  Abscess from Bone, lumbar spine      Anaerobic Culture No Anaerobes isolated    Blood Culture FREQ X 2 [[id number]] Collected:  11/11/17 1501    Order Status:  Completed Lab Status:  Final result Updated:  11/16/17 1700    Specimen:  Blood from Specimen:  Other from Nares      MRSA Screen By PCR Negative         H&P - H&P Note      H&P signed by Delilah Llamas MD at 11/11/2017  6:37 PM  Version 1 of 1    Author:  Delilah Llamas MD Service:  Hospitalist Author Type:  Physician    Filed: to go to the ED after abnormal MRI. Has been having back pain for over 1 year, has RLE radicular pain. Has been in rehab, not improving, had MRI L-spine done yesterday, findings concerning for possible epidural abscess.  No symptoms of fevers/chills/worseni MSK:[GV.1] negative SLR bilaterally, decreased strength hip flexor bilaterally[GV.2]  Neuro: Grossly normal, CN intact, sensory intact  Psych: Affect- normal  SKIN: warm, dry  EXT: no edema    Diagnostic Data:    CBC/Chem    Recent Labs   Lab  11/11/17   1 THALIA Hong at 11/10/2017 5:10 PM, who expressed understanding of the results. [GV.1]           Electronically signed by Tadeo Simental MD on 11/11/2017  6:37 PM   Attribution Becker    GV. 1 - Tadeo Simental MD on 11/11/2017  3:48 PM  GV. 2 - Vida Springer • Osteoarthrosis, unspecified whether generalized or localized, unspecified site     Left knee osteoarhtritis   • Other ill-defined conditions(799.89)     Hx if polyps       Family History   Problem Relation Age of Onset   • Cancer Father    • Cancer Broth BP Temp Temp src Pulse Resp SpO2   11/14/17 1044 148/78 - - 122 20 99 %   11/14/17 0915 (!) 171/89 (!) 97.5 °F (36.4 °C) Oral 76 18 100 %   11/14/17 0428 151/89 (!) 97.3 °F (36.3 °C) Oral - 18 97 %   11/14/17 0014 - - - 115 - -   11/13/17 2300 154/56 - - CREATSERUM  1.30  0.83  0.77   GFRAA  46*  >60  >60   GFRNAA  38*  >60  >60   CA  8.4*  7.7*  7.4*   NA  139  142  139   K  4.0  3.6  4.5  4.5   CL  114*  118*  116*   CO2  18*  14*  19*     No results for input(s): TROP, CK in the last 72 hours.   Recent L help bring trunk up to sitting. Pt needed Min A for transfers. Pt amb 100 ft w/ RW & Min A. Pt is legally blind and needed assistance w/ steering RW around corners and obstacles. VC's to keep RW close to body. Pt would benefit from a bakari walker.  Pt was -   Need to walk in hospital room?: A Little   -   Climbing 3-5 steps with a railing?: A Lot    AM-PAC Score:  Raw Score: 17   PT Approx Degree of Impairment Score: 50.57%   Standardized Score (AM-PAC Scale): 42.13   CMS Modifier (G-Code): CK    FUNCTIONAL Author:  Raisa Sesay OT Service:  (none) Author Type:  Occupational Therapist    Filed:  11/19/2017 10:06 AM Date of Service:  11/19/2017 10:00 AM Status:  Signed    :  Raisa Sesay OT (Occupational Therapist)       . OCCUPATIONAL THERAPY TREATMEN -   Toileting, which includes using toilet, bedpan or urinal? : A Lot  -   Putting on and taking off regular upper body clothing?: A Little  -   Taking care of personal grooming such as brushing teeth?: A Little  -   Eating meals?: A Little    AM-PAC Score